# Patient Record
Sex: MALE | Race: WHITE | HISPANIC OR LATINO | URBAN - METROPOLITAN AREA
[De-identification: names, ages, dates, MRNs, and addresses within clinical notes are randomized per-mention and may not be internally consistent; named-entity substitution may affect disease eponyms.]

---

## 2020-07-17 ENCOUNTER — EMERGENCY (EMERGENCY)
Facility: HOSPITAL | Age: 70
LOS: 0 days | Discharge: HOME | End: 2020-07-17
Attending: EMERGENCY MEDICINE | Admitting: EMERGENCY MEDICINE
Payer: SUBSIDIZED

## 2020-07-17 VITALS
TEMPERATURE: 97 F | SYSTOLIC BLOOD PRESSURE: 202 MMHG | HEART RATE: 71 BPM | OXYGEN SATURATION: 96 % | RESPIRATION RATE: 18 BRPM | DIASTOLIC BLOOD PRESSURE: 103 MMHG | WEIGHT: 169.98 LBS

## 2020-07-17 VITALS
OXYGEN SATURATION: 97 % | SYSTOLIC BLOOD PRESSURE: 209 MMHG | RESPIRATION RATE: 18 BRPM | HEART RATE: 69 BPM | DIASTOLIC BLOOD PRESSURE: 95 MMHG

## 2020-07-17 DIAGNOSIS — I10 ESSENTIAL (PRIMARY) HYPERTENSION: ICD-10-CM

## 2020-07-17 DIAGNOSIS — Z79.899 OTHER LONG TERM (CURRENT) DRUG THERAPY: ICD-10-CM

## 2020-07-17 PROCEDURE — 99283 EMERGENCY DEPT VISIT LOW MDM: CPT

## 2020-07-17 RX ORDER — AMLODIPINE BESYLATE 2.5 MG/1
1 TABLET ORAL
Qty: 14 | Refills: 0
Start: 2020-07-17 | End: 2020-07-30

## 2020-07-17 RX ORDER — AMLODIPINE BESYLATE 2.5 MG/1
5 TABLET ORAL ONCE
Refills: 0 | Status: COMPLETED | OUTPATIENT
Start: 2020-07-17 | End: 2020-07-17

## 2020-07-17 RX ADMIN — AMLODIPINE BESYLATE 5 MILLIGRAM(S): 2.5 TABLET ORAL at 15:48

## 2020-07-17 NOTE — ED PROVIDER NOTE - CLINICAL SUMMARY MEDICAL DECISION MAKING FREE TEXT BOX
71 yo man with asymptomatic HTN noted while at the dentist.  Has had no healthcare.  Well appearing and normal exam.  Amlodipine and outpatient referral to medical clinic.

## 2020-07-17 NOTE — ED PROVIDER NOTE - NSFOLLOWUPCLINICS_GEN_ALL_ED_FT
[facility]  Medicine  [address], [place]   Phone: [phone]  Fax:   Follow Up Time: 1-3 Days

## 2020-07-17 NOTE — ED PROVIDER NOTE - NS ED ROS FT
Constitutional: See HPI.  Eyes: No visual changes, eye pain or discharge. No Photophobia  ENMT: No hearing changes, pain, discharge or infections. No neck pain or stiffness. No limited ROM  Cardiac: No SOB or edema. No chest pain with exertion.  Respiratory: No cough or respiratory distress.  GI: No nausea, vomiting, diarrhea or abdominal pain.  : No dysuria, frequency or burning. No Discharge  MS: No myalgia, muscle weakness, joint pain or back pain.  Neuro: No headache or weakness. No LOC.  Skin: No skin rash.  Except as documented in the HPI, all other systems are negative.

## 2020-07-17 NOTE — ED ADULT TRIAGE NOTE - CHIEF COMPLAINT QUOTE
"I went to the dentist yesterday and my blood pressure is high" pt states he doesn't know if he has history of hypertension because he hasn't been to the doctor in years

## 2020-07-17 NOTE — ED ADULT NURSE NOTE - NSIMPLEMENTINTERV_GEN_ALL_ED
Implemented All Universal Safety Interventions:  [place] to call system. Call bell, personal items and telephone within reach. Instruct patient to call for assistance. Room bathroom lighting operational. Non-slip footwear when patient is off stretcher. Physically safe environment: no spills, clutter or unnecessary equipment. Stretcher in lowest position, wheels locked, appropriate side rails in place.

## 2020-07-17 NOTE — ED PROVIDER NOTE - ATTENDING CONTRIBUTION TO CARE
I personally evaluated the patient. I reviewed the Resident’s or Physician Assistant’s note (as assigned above), and agree with the findings and plan except as documented in my note.  69 yo man with asymptomatic HTN.  Noted while at the dentist.  Will start amlodipine and refer to medical clinic with rapid appt made for patient.

## 2020-07-17 NOTE — ED ADULT NURSE NOTE - OBJECTIVE STATEMENT
Patient states he has high blood pressure. he was seen at his dentist yesterday where they did his BP. pt has no hx of HTN

## 2020-07-17 NOTE — ED PROVIDER NOTE - OBJECTIVE STATEMENT
69 y/o M with no known PMHx p/w hypertension, patient states he went to dentist and noticed  high blood pressure 200 systolic. denies any symptoms in the ED, no chest pain, no shortness of breath, no abdominal pain, no numbness, no weakness. Patient has not seen doctor in more than 5 years, no other complaints.

## 2020-07-17 NOTE — ED PROVIDER NOTE - PATIENT PORTAL LINK FT
You can access the FollowMyHealth Patient Portal offered by [facility] by registering at the following website: [email or web address]. By joining [employer]’s FollowMyHealth portal, you will also be able to view your health information using other applications (apps) compatible with our system.

## 2020-07-17 NOTE — ED ADULT NURSE REASSESSMENT NOTE - NS ED NURSE REASSESS COMMENT FT1
Pt reassessed A/O times 4 asymptomatic denies chest pain no SOB no dizziness is seen evaluate by ED attending Amlodipine 5 mg po order is given ,pt is seen evaluate by ED attending clear to go home with family members ambulate steady verbalize understanding of instruction given .

## 2020-07-29 ENCOUNTER — APPOINTMENT (OUTPATIENT)
Dept: GERIATRICS | Facility: CLINIC | Age: 70
End: 2020-07-29
Payer: COMMERCIAL

## 2020-07-29 ENCOUNTER — OUTPATIENT (OUTPATIENT)
Dept: OUTPATIENT SERVICES | Facility: HOSPITAL | Age: 70
LOS: 1 days | Discharge: HOME | End: 2020-07-29
Payer: SUBSIDIZED

## 2020-07-29 ENCOUNTER — OUTPATIENT (OUTPATIENT)
Dept: OUTPATIENT SERVICES | Facility: HOSPITAL | Age: 70
LOS: 1 days | Discharge: HOME | End: 2020-07-29

## 2020-07-29 VITALS
TEMPERATURE: 98.6 F | HEART RATE: 49 BPM | SYSTOLIC BLOOD PRESSURE: 178 MMHG | DIASTOLIC BLOOD PRESSURE: 89 MMHG | OXYGEN SATURATION: 97 %

## 2020-07-29 DIAGNOSIS — Z80.49 FAMILY HISTORY OF MALIGNANT NEOPLASM OF OTHER GENITAL ORGANS: ICD-10-CM

## 2020-07-29 DIAGNOSIS — Z87.891 PERSONAL HISTORY OF NICOTINE DEPENDENCE: ICD-10-CM

## 2020-07-29 DIAGNOSIS — Z80.0 FAMILY HISTORY OF MALIGNANT NEOPLASM OF DIGESTIVE ORGANS: ICD-10-CM

## 2020-07-29 DIAGNOSIS — Z87.19 PERSONAL HISTORY OF OTHER DISEASES OF THE DIGESTIVE SYSTEM: ICD-10-CM

## 2020-07-29 DIAGNOSIS — R94.31 ABNORMAL ELECTROCARDIOGRAM [ECG] [EKG]: ICD-10-CM

## 2020-07-29 DIAGNOSIS — K02.9 DENTAL CARIES, UNSPECIFIED: ICD-10-CM

## 2020-07-29 DIAGNOSIS — Z82.49 FAMILY HISTORY OF ISCHEMIC HEART DISEASE AND OTHER DISEASES OF THE CIRCULATORY SYSTEM: ICD-10-CM

## 2020-07-29 PROCEDURE — 99203 OFFICE O/P NEW LOW 30 MIN: CPT | Mod: GC

## 2020-07-29 PROCEDURE — 93010 ELECTROCARDIOGRAM REPORT: CPT

## 2020-07-29 RX ORDER — AMLODIPINE BESYLATE 5 MG/1
5 TABLET ORAL DAILY
Qty: 60 | Refills: 0 | Status: COMPLETED | COMMUNITY
Start: 2020-07-29

## 2020-07-30 NOTE — REVIEW OF SYSTEMS
[Pain] : pain [Vision Problems] : vision problems [Abdominal Pain] : abdominal pain [Constipation] : constipation [Dysuria] : dysuria [Hesitancy] : hesitancy [Itching] : itching [Mole Changes] : mole changes [Memory Loss] : memory loss [Insomnia] : insomnia [Depression] : depression [Negative] : ENT [Discharge] : no discharge [Dryness] : no dryness [Redness] : no redness [Itching] : no itching [Chest Pain] : no chest pain [Palpitations] : no palpitations [Claudication] : no  leg claudication [Lower Ext Edema] : no lower extremity edema [Orthopena] : no orthopnea [Paroxysmal Nocturnal Dyspnea] : no paroxysmal nocturnal dyspnea [Shortness Of Breath] : no shortness of breath [Cough] : no cough [Dyspnea on Exertion] : not dyspnea on exertion [Diarrhea] : no diarrhea [Nausea] : no nausea [Heartburn] : no heartburn [Vomiting] : no vomiting [Incontinence] : no incontinence [Melena] : no melena [Hematuria] : no hematuria [Frequency] : no frequency [Joint Pain] : no joint pain [Joint Stiffness] : no joint stiffness [Muscle Pain] : no muscle pain [Back Pain] : no back pain [Muscle Weakness] : no muscle weakness [Joint Swelling] : no joint swelling [Nail Changes] : no nail changes [Hair Changes] : no hair changes [Skin Rash] : no skin rash [Headache] : no headache [Dizziness] : no dizziness [Fainting] : no fainting [Confusion] : no confusion [Unsteady Walk] : no ataxia [Suicidal] : not suicidal [Anxiety] : no anxiety

## 2020-07-30 NOTE — HISTORY OF PRESENT ILLNESS
[FreeTextEntry1] : Hypertension, Initial visit [de-identified] : 69 y/o M w/ no pmh but no established PCP, p/w hypertension and to establish care.\par Earlier this month he went to the dentist and was found to have systolic pressure of ~200 and sent to ED. He was asymptomatic at the time. In the ED his BP was 202/103 w/ HR 73. He was given amlodipine 5 mg and was sent home. Patient was prescribed amlodipine and sent to pharmacy but states he did not receive the medication.\par Today in clinic, his BP is 178/89, HR 49, asymptomatic.

## 2020-07-30 NOTE — ASSESSMENT
[FreeTextEntry1] : 71 y/o M presents to clinic w/ recently diagnosed HTN after HTN urgency in ED, here today to establish care. Patient was prescribed amlodipine 5 mg qd but did not  medication from pharmacy. BP elevated today at 178/89 w/ baseline bradycardia (49 b/m, asymptomatic). \par \par #HTN (uncontrolled)\par ~200/100 in ED (7/17/20); Today 178/89\par Has not started amlodipine yet\par > Start amlodipine 5 mg PO qd, sent to [facility]\par > Daily home BP monitoring, BP cuff sent to home\par \par #HCM\par No prior labs on file\par > CBC, CMP, A1c, TSH\par > EKG\par \par #BPH (likely)\par Urinary hesitancy, frequency\par > Bladder scan\par \par #Vision changes and eye pain\par Cloudy, pain over the eyelids, worse after reading\par > Ophthalmology referral

## 2020-07-30 NOTE — PHYSICAL EXAM
[No Acute Distress] : no acute distress [Well Nourished] : well nourished [Well Developed] : well developed [Well-Appearing] : well-appearing [Normal Sclera/Conjunctiva] : normal sclera/conjunctiva [PERRL] : pupils equal round and reactive to light [EOMI] : extraocular movements intact [Normal Outer Ear/Nose] : the outer ears and nose were normal in appearance [Normal Oropharynx] : the oropharynx was normal [No Abdominal Bruit] : a ~M bruit was not heard ~T in the abdomen [No Edema] : there was no peripheral edema [No Extremity Clubbing/Cyanosis] : no extremity clubbing/cyanosis [Soft] : abdomen soft [Non-distended] : non-distended [Normal Bowel Sounds] : normal bowel sounds [LLQ] : in the left lower quadrant [Normal] : affect was normal and insight and judgment were intact [de-identified] : Several nevi, patient states many of these (espeically on his back) are new.

## 2020-07-30 NOTE — HEALTH RISK ASSESSMENT
[Fair] :  ~his/her~ mood as fair [Excellent] : ~his/her~ current health as excellent [] : Yes [Yes] : Yes [11-15] : 11-15 [Monthly or less (1 pt)] : Monthly or less (1 point) [No falls in past year] : Patient reported no falls in the past year [FreeTextEntry1] : Dentures [de-identified] : Walking in the park [YearQuit] : 2000 [de-identified] : No dietary restrictions

## 2020-07-31 ENCOUNTER — OUTPATIENT (OUTPATIENT)
Dept: OUTPATIENT SERVICES | Facility: HOSPITAL | Age: 70
LOS: 1 days | Discharge: HOME | End: 2020-07-31
Payer: SUBSIDIZED

## 2020-07-31 ENCOUNTER — OUTPATIENT (OUTPATIENT)
Dept: OUTPATIENT SERVICES | Facility: HOSPITAL | Age: 70
LOS: 1 days | Discharge: HOME | End: 2020-07-31

## 2020-07-31 PROCEDURE — 92012 INTRM OPH EXAM EST PATIENT: CPT

## 2020-07-31 PROCEDURE — 92134 CPTRZ OPH DX IMG PST SGM RTA: CPT | Mod: 26

## 2020-08-03 DIAGNOSIS — Z80.0 FAMILY HISTORY OF MALIGNANT NEOPLASM OF DIGESTIVE ORGANS: ICD-10-CM

## 2020-08-03 DIAGNOSIS — Z80.49 FAMILY HISTORY OF MALIGNANT NEOPLASM OF OTHER GENITAL ORGANS: ICD-10-CM

## 2020-08-03 DIAGNOSIS — K02.9 DENTAL CARIES, UNSPECIFIED: ICD-10-CM

## 2020-08-03 DIAGNOSIS — Z87.891 PERSONAL HISTORY OF NICOTINE DEPENDENCE: ICD-10-CM

## 2020-08-03 DIAGNOSIS — E66.9 OBESITY, UNSPECIFIED: ICD-10-CM

## 2020-08-03 DIAGNOSIS — N40.0 BENIGN PROSTATIC HYPERPLASIA WITHOUT LOWER URINARY TRACT SYMPTOMS: ICD-10-CM

## 2020-08-03 DIAGNOSIS — I10 ESSENTIAL (PRIMARY) HYPERTENSION: ICD-10-CM

## 2020-08-03 DIAGNOSIS — Z82.49 FAMILY HISTORY OF ISCHEMIC HEART DISEASE AND OTHER DISEASES OF THE CIRCULATORY SYSTEM: ICD-10-CM

## 2020-08-03 DIAGNOSIS — Z87.19 PERSONAL HISTORY OF OTHER DISEASES OF THE DIGESTIVE SYSTEM: ICD-10-CM

## 2020-08-25 LAB
ALBUMIN SERPL ELPH-MCNC: 4.6 G/DL
ALP BLD-CCNC: 83 U/L
ALT SERPL-CCNC: 18 U/L
ANION GAP SERPL CALC-SCNC: 14 MMOL/L
AST SERPL-CCNC: 20 U/L
BASOPHILS # BLD AUTO: 0.02 K/UL
BASOPHILS NFR BLD AUTO: 0.4 %
BILIRUB SERPL-MCNC: 0.5 MG/DL
BUN SERPL-MCNC: 11 MG/DL
CALCIUM SERPL-MCNC: 9.2 MG/DL
CHLORIDE SERPL-SCNC: 100 MMOL/L
CHOLEST SERPL-MCNC: 192 MG/DL
CHOLEST/HDLC SERPL: 5.3 RATIO
CO2 SERPL-SCNC: 25 MMOL/L
CREAT SERPL-MCNC: 0.9 MG/DL
CREAT SPEC-SCNC: 102 MG/DL
EOSINOPHIL # BLD AUTO: 0.1 K/UL
EOSINOPHIL NFR BLD AUTO: 1.9 %
ESTIMATED AVERAGE GLUCOSE: 114 MG/DL
GLUCOSE SERPL-MCNC: 100 MG/DL
HBA1C MFR BLD HPLC: 5.6 %
HCT VFR BLD CALC: 45.2 %
HDLC SERPL-MCNC: 36 MG/DL
HGB BLD-MCNC: 13.8 G/DL
IMM GRANULOCYTES NFR BLD AUTO: 0.8 %
LDLC SERPL CALC-MCNC: 102 MG/DL
LYMPHOCYTES # BLD AUTO: 1.25 K/UL
LYMPHOCYTES NFR BLD AUTO: 23.7 %
MAN DIFF?: NORMAL
MCHC RBC-ENTMCNC: 29.4 PG
MCHC RBC-ENTMCNC: 30.5 G/DL
MCV RBC AUTO: 96.4 FL
MICROALBUMIN 24H UR DL<=1MG/L-MCNC: 3.3 MG/DL
MICROALBUMIN/CREAT 24H UR-RTO: 32 MG/G
MONOCYTES # BLD AUTO: 0.45 K/UL
MONOCYTES NFR BLD AUTO: 8.5 %
NEUTROPHILS # BLD AUTO: 3.41 K/UL
NEUTROPHILS NFR BLD AUTO: 64.7 %
PLATELET # BLD AUTO: 199 K/UL
POTASSIUM SERPL-SCNC: 5 MMOL/L
PROT SERPL-MCNC: 7 G/DL
RBC # BLD: 4.69 M/UL
RBC # FLD: 14.3 %
SODIUM SERPL-SCNC: 139 MMOL/L
TRIGL SERPL-MCNC: 293 MG/DL
TSH SERPL-ACNC: 2.67 UIU/ML
WBC # FLD AUTO: 5.27 K/UL

## 2020-08-26 ENCOUNTER — APPOINTMENT (OUTPATIENT)
Dept: GERIATRICS | Facility: CLINIC | Age: 70
End: 2020-08-26

## 2020-09-08 ENCOUNTER — APPOINTMENT (OUTPATIENT)
Dept: INTERNAL MEDICINE | Facility: CLINIC | Age: 70
End: 2020-09-08

## 2020-09-30 ENCOUNTER — OUTPATIENT (OUTPATIENT)
Dept: OUTPATIENT SERVICES | Facility: HOSPITAL | Age: 70
LOS: 1 days | Discharge: HOME | End: 2020-09-30

## 2020-09-30 ENCOUNTER — APPOINTMENT (OUTPATIENT)
Dept: GERIATRICS | Facility: CLINIC | Age: 70
End: 2020-09-30
Payer: COMMERCIAL

## 2020-09-30 VITALS
BODY MASS INDEX: 33.63 KG/M2 | DIASTOLIC BLOOD PRESSURE: 84 MMHG | WEIGHT: 197 LBS | TEMPERATURE: 97.6 F | HEIGHT: 64 IN | SYSTOLIC BLOOD PRESSURE: 160 MMHG

## 2020-09-30 DIAGNOSIS — H53.9 UNSPECIFIED VISUAL DISTURBANCE: ICD-10-CM

## 2020-09-30 DIAGNOSIS — I10 ESSENTIAL (PRIMARY) HYPERTENSION: ICD-10-CM

## 2020-09-30 DIAGNOSIS — N40.0 BENIGN PROSTATIC HYPERPLASIA WITHOUT LOWER URINARY TRACT SYMPTOMS: ICD-10-CM

## 2020-09-30 PROCEDURE — 99212 OFFICE O/P EST SF 10 MIN: CPT

## 2020-09-30 RX ORDER — AMLODIPINE BESYLATE 5 MG/1
5 TABLET ORAL DAILY
Qty: 90 | Refills: 0 | Status: DISCONTINUED | COMMUNITY
Start: 2020-07-29 | End: 2020-09-30

## 2020-09-30 NOTE — END OF VISIT
[] : Resident [FreeTextEntry3] : Seen with geriatrics Team; patient has HTN - did not take his medication today and his systolic is over 160 - counseled patient and daughter at length re importance of compliance with medications and his CV/stroke risk without BP control; they expressed understanding; also patient refuses referral for screening colonoscopy albeit mild anemia; counseled; also refuses flu vaccine "I don't need it; I feel strong";counseled that during COVID pandemic it is especially important to be vaccinated; patient also has symptoms c/w BPH with LUTS; will start tamsulosin and if no improvement he then agrees to urology evaluation.

## 2020-09-30 NOTE — PHYSICAL EXAM
[General Appearance - In No Acute Distress] : in no acute distress [Sclera] : the sclera and conjunctiva were normal [General Appearance - Alert] : alert [Apical Impulse] : the apical impulse was normal [] : no respiratory distress [Normal Oral Mucosa] : normal oral mucosa [No CVA Tenderness] : no ~M costovertebral angle tenderness [Bowel Sounds] : normal bowel sounds [Abnormal Walk] : normal gait

## 2020-09-30 NOTE — ASSESSMENT
[FreeTextEntry1] : 71 Y/O M with PMH of HTN is here for the follow up.\par \par HTN\par -Amlodipine 10mg, home readings stable\par -Forgot to take med today\par \par LUTS\par -Most likely BPH\par -Will give Tamsulosin 0.4mg\par -If symptoms does not improve will send to urology\par \par Vision changes\par -B/L Vision changes\par -Opthalmology referral, patient have the referral\par \par HCM\par -Routine labs\par -Refuses Flu shot and Colonoscopy

## 2020-09-30 NOTE — HISTORY OF PRESENT ILLNESS
[FreeTextEntry1] : 71 Y/O M with PMH of HTN, Vision problems and difficulty urination is here to establish care.

## 2020-10-06 ENCOUNTER — OUTPATIENT (OUTPATIENT)
Dept: OUTPATIENT SERVICES | Facility: HOSPITAL | Age: 70
LOS: 1 days | Discharge: HOME | End: 2020-10-06

## 2020-10-06 ENCOUNTER — APPOINTMENT (OUTPATIENT)
Dept: OPHTHALMOLOGY | Facility: CLINIC | Age: 70
End: 2020-10-06
Payer: COMMERCIAL

## 2020-10-06 PROCEDURE — 92020 GONIOSCOPY: CPT

## 2020-10-06 PROCEDURE — 92012 INTRM OPH EXAM EST PATIENT: CPT

## 2020-10-07 ENCOUNTER — OUTPATIENT (OUTPATIENT)
Dept: OUTPATIENT SERVICES | Facility: HOSPITAL | Age: 70
LOS: 1 days | Discharge: HOME | End: 2020-10-07

## 2020-10-07 DIAGNOSIS — K02.63 DENTAL CARIES ON SMOOTH SURFACE PENETRATING INTO PULP: ICD-10-CM

## 2020-10-09 DIAGNOSIS — H52.4 PRESBYOPIA: ICD-10-CM

## 2020-10-09 DIAGNOSIS — H40.033 ANATOMICAL NARROW ANGLE, BILATERAL: ICD-10-CM

## 2020-10-09 DIAGNOSIS — H11.043 PERIPHERAL PTERYGIUM, STATIONARY, BILATERAL: ICD-10-CM

## 2020-12-30 ENCOUNTER — APPOINTMENT (OUTPATIENT)
Dept: GERIATRICS | Facility: CLINIC | Age: 70
End: 2020-12-30

## 2021-01-27 RX ORDER — AMLODIPINE BESYLATE 10 MG/1
10 TABLET ORAL
Refills: 0 | Status: COMPLETED | COMMUNITY
End: 2021-01-27

## 2021-05-20 ENCOUNTER — OUTPATIENT (OUTPATIENT)
Dept: OUTPATIENT SERVICES | Facility: HOSPITAL | Age: 71
LOS: 1 days | Discharge: HOME | End: 2021-05-20

## 2021-05-20 ENCOUNTER — APPOINTMENT (OUTPATIENT)
Dept: GERIATRICS | Facility: CLINIC | Age: 71
End: 2021-05-20

## 2021-05-27 NOTE — HISTORY OF PRESENT ILLNESS
[FreeTextEntry1] :  72 yo M with PMHX BPH, HTN, obesity being seen for f/u visit. last seen Sept 2020. Pt had 12:30 appt was unable to stay, stating he had to go to airport. Medications were refilled. He has a 6/17 apptm

## 2021-05-27 NOTE — ASSESSMENT
[FreeTextEntry1] : 72 yo M PMHx HTN, LUTS, BPH Vision changes, \par \par # HTN\par - 160/84 today\par -Amlodipine 10mg, home readings stable\par -Forgot to take med today\par \par # Bradycardia\par - Asymptomatic \par - Seen on EKG 2020\par \par #Lower urinary tract symptoms\par # BPH\par -Tamsulosin 0.4mg\par -If symptoms does not improve will send to urology\par \par # Vision changes\par -B/L Vision changes\par -Opthalmology referral, patient have the referral\par \par HCM\par -Routine labs\par -Refuses Flu shot and Colonoscopy. \par - RTC in ____\par - Routine labs ordered

## 2021-06-04 DIAGNOSIS — Z02.9 ENCOUNTER FOR ADMINISTRATIVE EXAMINATIONS, UNSPECIFIED: ICD-10-CM

## 2021-06-10 ENCOUNTER — RX RENEWAL (OUTPATIENT)
Age: 71
End: 2021-06-10

## 2021-06-17 ENCOUNTER — OUTPATIENT (OUTPATIENT)
Dept: OUTPATIENT SERVICES | Facility: HOSPITAL | Age: 71
LOS: 1 days | Discharge: HOME | End: 2021-06-17

## 2021-06-17 ENCOUNTER — NON-APPOINTMENT (OUTPATIENT)
Age: 71
End: 2021-06-17

## 2021-06-17 ENCOUNTER — APPOINTMENT (OUTPATIENT)
Dept: GERIATRICS | Facility: CLINIC | Age: 71
End: 2021-06-17
Payer: COMMERCIAL

## 2021-06-17 VITALS
BODY MASS INDEX: 35.34 KG/M2 | HEART RATE: 54 BPM | TEMPERATURE: 96.3 F | DIASTOLIC BLOOD PRESSURE: 83 MMHG | HEIGHT: 64 IN | WEIGHT: 207 LBS | OXYGEN SATURATION: 98 % | SYSTOLIC BLOOD PRESSURE: 156 MMHG

## 2021-06-17 DIAGNOSIS — H53.9 UNSPECIFIED VISUAL DISTURBANCE: ICD-10-CM

## 2021-06-17 DIAGNOSIS — Z00.00 ENCOUNTER FOR GENERAL ADULT MEDICAL EXAMINATION W/OUT ABNORMAL FINDINGS: ICD-10-CM

## 2021-06-17 PROCEDURE — 99212 OFFICE O/P EST SF 10 MIN: CPT

## 2021-06-17 NOTE — REVIEW OF SYSTEMS
[Fever] : no fever [Chills] : no chills [Feeling Poorly] : not feeling poorly [Feeling Tired] : not feeling tired [Recent Weight Gain (___ Lbs)] : no recent weight gain [Eye Pain] : no eye pain [Red Eyes] : eyes not red [Eyesight Problems] : no eyesight problems [Discharge From Eyes] : no purulent discharge from the eyes [Earache] : no earache [Loss Of Hearing] : no hearing loss [Nosebleeds] : no nosebleeds [Nasal Discharge] : no nasal discharge [Shortness Of Breath] : no shortness of breath [Wheezing] : no wheezing [Cough] : no cough [SOB on Exertion] : no shortness of breath during exertion [Abdominal Pain] : no abdominal pain [Vomiting] : no vomiting [Constipation] : no constipation [Diarrhea] : no diarrhea [Arthralgias] : no arthralgias [Joint Pain] : no joint pain [Joint Swelling] : no joint swelling [Joint Stiffness] : no joint stiffness [Skin Lesions] : no skin lesions [Itching] : no itching [Suicidal] : not suicidal [Proptosis] : no proptosis

## 2021-06-17 NOTE — END OF VISIT
[] : Resident [FreeTextEntry3] : SEEN WITH [facility] TEAM; poor health literacy - spoke with patient in Thai about why he won't get COVID vaccine - " I don’t take tylenol because I don’t have a HA; i wont take vaccine because I feel fine"; will continue to encourage patient at subsequent visits; also adding to meds for symptoms of BPH with LUTS

## 2021-06-17 NOTE — ASSESSMENT
[FreeTextEntry1] : Case of a 71 year old male patient with BPH and HTN presenting for regular checkup\par \par BPH. \par * Home meds Tamsulosin 0.4 mg QD\par * Complaining of difficulty initiating stream, intermittence, PVR\par - Will add finasteride 6mg QD\par - Will consider urology referral\par \par HTN\par * Well Controlled. \par * /83mmHg during this visit. \par * Home med Amlodipine 10mg QD\par - Monitor BP closely\par - Continue Amlodipine 10mg QD\par - Educated about DASH diet and exercise\par \par HCM\par - Refused colonoscopy in past and currently\par - Refused flu shot\par - Refused COVID vaccine\par - Received PPSV 11/02/20\par - Received Tdap 11/02/20

## 2021-06-17 NOTE — HISTORY OF PRESENT ILLNESS
[FreeTextEntry1] : 71 year old male patient\par - BPH. Home meds Tamsulosin 0.4 mg QD\par - HTN. Well Controlled. /83mmHg during this visit. Home med Amlodipine 10mg QD\par \par He is presenting to us for follow up.\par Patient reports adequate appetite and denies nausea or vomiting or weight change or change in bowel movements (hematochezia or melena).\par He reports urinary symptoms: burning (dysuria), intermittence, difficulty initiating stream, and PVR in the absence of hematuria or cloudy urine.\par ROs negative for fever or chills or GI or URTI symptoms.

## 2021-06-17 NOTE — PHYSICAL EXAM
[General Appearance - Alert] : alert [General Appearance - In No Acute Distress] : in no acute distress [General Appearance - Well Nourished] : well nourished [General Appearance - Well Developed] : well developed [General Appearance - Well-Appearing] : healthy appearing [Sclera] : the sclera and conjunctiva were normal [Normal Oral Mucosa] : normal oral mucosa [No Oral Pallor] : no oral pallor [No Oral Cyanosis] : no oral cyanosis [Neck Appearance] : the appearance of the neck was normal [Neck Cervical Mass (___cm)] : no neck mass was observed [Jugular Venous Distention Increased] : there was no jugular-venous distention [Respiration, Rhythm And Depth] : normal respiratory rhythm and effort [Exaggerated Use Of Accessory Muscles For Inspiration] : no accessory muscle use [Chest Palpation] : palpation of the chest revealed no abnormalities [Auscultation Breath Sounds / Voice Sounds] : lungs were clear to auscultation bilaterally [Apical Impulse] : the apical impulse was normal [Heart Rate And Rhythm] : heart rate was normal and rhythm regular [Heart Sounds Gallop] : no gallops [Heart Sounds] : normal S1 and S2 [Murmurs] : no murmurs [Heart Sounds Pericardial Friction Rub] : no pericardial rub [Arterial Pulses Carotid] : carotid pulses were normal with no bruits [No CVA Tenderness] : no ~M costovertebral angle tenderness [Nail Clubbing] : no clubbing  or cyanosis of the fingernails [Abnormal Walk] : normal gait [Involuntary Movements] : no involuntary movements were seen [Musculoskeletal - Swelling] : no joint swelling seen [Skin Color & Pigmentation] : normal skin color and pigmentation [Motor Tone] : muscle strength and tone were normal [Skin Lesions] : no skin lesions [] : no rash [Cranial Nerves] : cranial nerves 2-12 were intact

## 2021-06-24 LAB
25(OH)D3 SERPL-MCNC: 22 NG/ML
ALBUMIN SERPL ELPH-MCNC: 4.5 G/DL
ALP BLD-CCNC: 90 U/L
ALT SERPL-CCNC: 39 U/L
ANION GAP SERPL CALC-SCNC: 10 MMOL/L
AST SERPL-CCNC: 36 U/L
BASOPHILS # BLD AUTO: 0.04 K/UL
BASOPHILS NFR BLD AUTO: 0.6 %
BILIRUB SERPL-MCNC: 0.8 MG/DL
BUN SERPL-MCNC: 12 MG/DL
CALCIUM SERPL-MCNC: 9.2 MG/DL
CHLORIDE SERPL-SCNC: 101 MMOL/L
CHOLEST SERPL-MCNC: 256 MG/DL
CO2 SERPL-SCNC: 25 MMOL/L
CREAT SERPL-MCNC: 0.8 MG/DL
EOSINOPHIL # BLD AUTO: 0.18 K/UL
EOSINOPHIL NFR BLD AUTO: 2.8 %
ESTIMATED AVERAGE GLUCOSE: 120 MG/DL
GLUCOSE SERPL-MCNC: 89 MG/DL
HBA1C MFR BLD HPLC: 5.8 %
HCT VFR BLD CALC: 41 %
HDLC SERPL-MCNC: 41 MG/DL
HGB BLD-MCNC: 13.2 G/DL
IMM GRANULOCYTES NFR BLD AUTO: 1.1 %
LDLC SERPL CALC-MCNC: 155 MG/DL
LYMPHOCYTES # BLD AUTO: 1.95 K/UL
LYMPHOCYTES NFR BLD AUTO: 29.9 %
MAN DIFF?: NORMAL
MCHC RBC-ENTMCNC: 30.3 PG
MCHC RBC-ENTMCNC: 32.2 G/DL
MCV RBC AUTO: 94.3 FL
MONOCYTES # BLD AUTO: 0.47 K/UL
MONOCYTES NFR BLD AUTO: 7.2 %
NEUTROPHILS # BLD AUTO: 3.81 K/UL
NEUTROPHILS NFR BLD AUTO: 58.4 %
NONHDLC SERPL-MCNC: 215 MG/DL
PLATELET # BLD AUTO: 206 K/UL
POTASSIUM SERPL-SCNC: 4.7 MMOL/L
PROT SERPL-MCNC: 6.9 G/DL
RBC # BLD: 4.35 M/UL
RBC # FLD: 14.9 %
SODIUM SERPL-SCNC: 136 MMOL/L
TRIGL SERPL-MCNC: 308 MG/DL
TSH SERPL-ACNC: 2.51 UIU/ML
WBC # FLD AUTO: 6.52 K/UL

## 2021-06-25 DIAGNOSIS — I10 ESSENTIAL (PRIMARY) HYPERTENSION: ICD-10-CM

## 2021-06-25 DIAGNOSIS — H53.9 UNSPECIFIED VISUAL DISTURBANCE: ICD-10-CM

## 2021-06-25 DIAGNOSIS — N40.0 BENIGN PROSTATIC HYPERPLASIA WITHOUT LOWER URINARY TRACT SYMPTOMS: ICD-10-CM

## 2021-12-03 ENCOUNTER — INPATIENT (INPATIENT)
Facility: HOSPITAL | Age: 71
LOS: 9 days | Discharge: HOME HEALTH | End: 2021-12-13
Attending: HOSPITALIST | Admitting: HOSPITALIST
Payer: MEDICAID

## 2021-12-03 VITALS — WEIGHT: 199.96 LBS

## 2021-12-03 DIAGNOSIS — U07.1 COVID-19: ICD-10-CM

## 2021-12-03 DIAGNOSIS — I10 ESSENTIAL (PRIMARY) HYPERTENSION: ICD-10-CM

## 2021-12-03 DIAGNOSIS — E66.9 OBESITY, UNSPECIFIED: ICD-10-CM

## 2021-12-03 DIAGNOSIS — E87.1 HYPO-OSMOLALITY AND HYPONATREMIA: ICD-10-CM

## 2021-12-03 DIAGNOSIS — J12.82 PNEUMONIA DUE TO CORONAVIRUS DISEASE 2019: ICD-10-CM

## 2021-12-03 DIAGNOSIS — J96.02 ACUTE RESPIRATORY FAILURE WITH HYPERCAPNIA: ICD-10-CM

## 2021-12-03 LAB
ALBUMIN SERPL ELPH-MCNC: 3.9 G/DL — SIGNIFICANT CHANGE UP (ref 3.5–5.2)
ALP SERPL-CCNC: 70 U/L — SIGNIFICANT CHANGE UP (ref 30–115)
ALT FLD-CCNC: 32 U/L — SIGNIFICANT CHANGE UP (ref 0–41)
ANION GAP SERPL CALC-SCNC: 13 MMOL/L — SIGNIFICANT CHANGE UP (ref 7–14)
AST SERPL-CCNC: 52 U/L — HIGH (ref 0–41)
BASE EXCESS BLDV CALC-SCNC: 1.2 MMOL/L — SIGNIFICANT CHANGE UP (ref -2–3)
BASOPHILS # BLD AUTO: 0.01 K/UL — SIGNIFICANT CHANGE UP (ref 0–0.2)
BASOPHILS NFR BLD AUTO: 0.2 % — SIGNIFICANT CHANGE UP (ref 0–1)
BILIRUB SERPL-MCNC: 0.6 MG/DL — SIGNIFICANT CHANGE UP (ref 0.2–1.2)
BUN SERPL-MCNC: 11 MG/DL — SIGNIFICANT CHANGE UP (ref 10–20)
CA-I SERPL-SCNC: 1.13 MMOL/L — LOW (ref 1.15–1.33)
CALCIUM SERPL-MCNC: 8.1 MG/DL — LOW (ref 8.5–10.1)
CHLORIDE SERPL-SCNC: 93 MMOL/L — LOW (ref 98–110)
CO2 SERPL-SCNC: 22 MMOL/L — SIGNIFICANT CHANGE UP (ref 17–32)
CREAT SERPL-MCNC: 0.9 MG/DL — SIGNIFICANT CHANGE UP (ref 0.7–1.5)
EOSINOPHIL # BLD AUTO: 0 K/UL — SIGNIFICANT CHANGE UP (ref 0–0.7)
EOSINOPHIL NFR BLD AUTO: 0 % — SIGNIFICANT CHANGE UP (ref 0–8)
GAS PNL BLDV: 126 MMOL/L — LOW (ref 136–145)
GAS PNL BLDV: SIGNIFICANT CHANGE UP
GLUCOSE SERPL-MCNC: 106 MG/DL — HIGH (ref 70–99)
HCO3 BLDV-SCNC: 26 MMOL/L — SIGNIFICANT CHANGE UP (ref 22–29)
HCT VFR BLD CALC: 37.4 % — LOW (ref 42–52)
HCT VFR BLDA CALC: 36 % — LOW (ref 39–51)
HGB BLD CALC-MCNC: 12 G/DL — LOW (ref 12.6–17.4)
HGB BLD-MCNC: 12.6 G/DL — LOW (ref 14–18)
IMM GRANULOCYTES NFR BLD AUTO: 0.7 % — HIGH (ref 0.1–0.3)
LACTATE BLDV-MCNC: 1.4 MMOL/L — SIGNIFICANT CHANGE UP (ref 0.5–2)
LYMPHOCYTES # BLD AUTO: 0.69 K/UL — LOW (ref 1.2–3.4)
LYMPHOCYTES # BLD AUTO: 15.2 % — LOW (ref 20.5–51.1)
MCHC RBC-ENTMCNC: 29.5 PG — SIGNIFICANT CHANGE UP (ref 27–31)
MCHC RBC-ENTMCNC: 33.7 G/DL — SIGNIFICANT CHANGE UP (ref 32–37)
MCV RBC AUTO: 87.6 FL — SIGNIFICANT CHANGE UP (ref 80–94)
MONOCYTES # BLD AUTO: 0.45 K/UL — SIGNIFICANT CHANGE UP (ref 0.1–0.6)
MONOCYTES NFR BLD AUTO: 9.9 % — HIGH (ref 1.7–9.3)
NEUTROPHILS # BLD AUTO: 3.36 K/UL — SIGNIFICANT CHANGE UP (ref 1.4–6.5)
NEUTROPHILS NFR BLD AUTO: 74 % — SIGNIFICANT CHANGE UP (ref 42.2–75.2)
NRBC # BLD: 0 /100 WBCS — SIGNIFICANT CHANGE UP (ref 0–0)
NT-PROBNP SERPL-SCNC: 52 PG/ML — SIGNIFICANT CHANGE UP (ref 0–300)
PCO2 BLDV: 41 MMHG — LOW (ref 42–55)
PH BLDV: 7.41 — SIGNIFICANT CHANGE UP (ref 7.32–7.43)
PLATELET # BLD AUTO: 179 K/UL — SIGNIFICANT CHANGE UP (ref 130–400)
PO2 BLDV: 34 MMHG — SIGNIFICANT CHANGE UP
POTASSIUM BLDV-SCNC: 4.2 MMOL/L — SIGNIFICANT CHANGE UP (ref 3.5–5.1)
POTASSIUM SERPL-MCNC: 4.7 MMOL/L — SIGNIFICANT CHANGE UP (ref 3.5–5)
POTASSIUM SERPL-SCNC: 4.7 MMOL/L — SIGNIFICANT CHANGE UP (ref 3.5–5)
PROT SERPL-MCNC: 6.3 G/DL — SIGNIFICANT CHANGE UP (ref 6–8)
RAPID RVP RESULT: DETECTED
RBC # BLD: 4.27 M/UL — LOW (ref 4.7–6.1)
RBC # FLD: 13.9 % — SIGNIFICANT CHANGE UP (ref 11.5–14.5)
SAO2 % BLDV: 61.9 % — SIGNIFICANT CHANGE UP
SARS-COV-2 RNA SPEC QL NAA+PROBE: DETECTED
SODIUM SERPL-SCNC: 128 MMOL/L — LOW (ref 135–146)
TROPONIN T SERPL-MCNC: <0.01 NG/ML — SIGNIFICANT CHANGE UP
WBC # BLD: 4.54 K/UL — LOW (ref 4.8–10.8)
WBC # FLD AUTO: 4.54 K/UL — LOW (ref 4.8–10.8)

## 2021-12-03 PROCEDURE — 71045 X-RAY EXAM CHEST 1 VIEW: CPT | Mod: 26

## 2021-12-03 PROCEDURE — 99223 1ST HOSP IP/OBS HIGH 75: CPT

## 2021-12-03 PROCEDURE — 99285 EMERGENCY DEPT VISIT HI MDM: CPT

## 2021-12-03 PROCEDURE — 99497 ADVNCD CARE PLAN 30 MIN: CPT | Mod: 25

## 2021-12-03 PROCEDURE — 70450 CT HEAD/BRAIN W/O DYE: CPT | Mod: 26,MA

## 2021-12-03 PROCEDURE — 93010 ELECTROCARDIOGRAM REPORT: CPT

## 2021-12-03 RX ORDER — PREGABALIN 225 MG/1
1 CAPSULE ORAL
Qty: 0 | Refills: 0 | DISCHARGE

## 2021-12-03 RX ORDER — ACETAMINOPHEN 500 MG
650 TABLET ORAL EVERY 8 HOURS
Refills: 0 | Status: ACTIVE | OUTPATIENT
Start: 2021-12-03 | End: 2022-11-01

## 2021-12-03 RX ORDER — SODIUM CHLORIDE 9 MG/ML
1000 INJECTION INTRAMUSCULAR; INTRAVENOUS; SUBCUTANEOUS
Refills: 0 | Status: COMPLETED | OUTPATIENT
Start: 2021-12-03 | End: 2021-12-04

## 2021-12-03 RX ORDER — DEXAMETHASONE 0.5 MG/5ML
6 ELIXIR ORAL ONCE
Refills: 0 | Status: COMPLETED | OUTPATIENT
Start: 2021-12-03 | End: 2021-12-03

## 2021-12-03 RX ORDER — ENOXAPARIN SODIUM 100 MG/ML
40 INJECTION SUBCUTANEOUS EVERY 12 HOURS
Refills: 0 | Status: ACTIVE | OUTPATIENT
Start: 2021-12-03 | End: 2022-11-01

## 2021-12-03 RX ORDER — CHOLECALCIFEROL (VITAMIN D3) 125 MCG
0 CAPSULE ORAL
Qty: 0 | Refills: 0 | DISCHARGE

## 2021-12-03 RX ORDER — ACETAMINOPHEN 500 MG
975 TABLET ORAL ONCE
Refills: 0 | Status: COMPLETED | OUTPATIENT
Start: 2021-12-03 | End: 2021-12-03

## 2021-12-03 RX ORDER — SODIUM CHLORIDE 9 MG/ML
1000 INJECTION INTRAMUSCULAR; INTRAVENOUS; SUBCUTANEOUS ONCE
Refills: 0 | Status: DISCONTINUED | OUTPATIENT
Start: 2021-12-03 | End: 2021-12-03

## 2021-12-03 RX ORDER — FUROSEMIDE 40 MG
20 TABLET ORAL ONCE
Refills: 0 | Status: COMPLETED | OUTPATIENT
Start: 2021-12-03 | End: 2021-12-03

## 2021-12-03 RX ORDER — DEXAMETHASONE 0.5 MG/5ML
6 ELIXIR ORAL DAILY
Refills: 0 | Status: COMPLETED | OUTPATIENT
Start: 2021-12-04 | End: 2021-12-12

## 2021-12-03 RX ORDER — REMDESIVIR 5 MG/ML
INJECTION INTRAVENOUS
Refills: 0 | Status: COMPLETED | OUTPATIENT
Start: 2021-12-04 | End: 2021-12-08

## 2021-12-03 RX ORDER — AMLODIPINE BESYLATE 2.5 MG/1
1 TABLET ORAL
Qty: 0 | Refills: 0 | DISCHARGE

## 2021-12-03 RX ORDER — AMLODIPINE BESYLATE 2.5 MG/1
5 TABLET ORAL DAILY
Refills: 0 | Status: ACTIVE | OUTPATIENT
Start: 2021-12-04 | End: 2022-11-02

## 2021-12-03 RX ADMIN — Medication 6 MILLIGRAM(S): at 22:16

## 2021-12-03 RX ADMIN — Medication 20 MILLIGRAM(S): at 22:16

## 2021-12-03 RX ADMIN — Medication 975 MILLIGRAM(S): at 19:14

## 2021-12-03 NOTE — ED ADULT TRIAGE NOTE - CHIEF COMPLAINT QUOTE
Patient brought in from home for generalized weakness and fever. Patient denies SOB, chest pain, n/v/d.

## 2021-12-03 NOTE — ED PROVIDER NOTE - CLINICAL SUMMARY MEDICAL DECISION MAKING FREE TEXT BOX
I have fully discussed the medical management and delivery of care with the patient. I have discussed any available labs, imaging and treatment options with the patient.  Pt admitted for further care & management.  Medical admitting team aware of pt and admission.

## 2021-12-03 NOTE — ED PROVIDER NOTE - CARE PLAN
Assessment and plan of treatment:	Plan: Monitor, NC, EKG, CXR, labs, urine, anti pyretic, reassess.   Principal Discharge DX:	2019 novel coronavirus disease (COVID-19)  Assessment and plan of treatment:	Plan: Monitor, NC, EKG, CXR, labs, urine, anti pyretic, reassess.   1 Principal Discharge DX:	2019 novel coronavirus disease (COVID-19)  Assessment and plan of treatment:	Plan: Monitor, NC, EKG, CXR, labs, urine, anti pyretic, reassess.  Secondary Diagnosis:	Hypoxia

## 2021-12-03 NOTE — ED PROVIDER NOTE - ATTENDING CONTRIBUTION TO CARE
[id number] used  72 y/o m w/ pmhx of htn and ? bph presents for about a week of decreased oral intake that has been worsening so came to ed. pt reports fever in ed [place] fever at home. pt denying any cough but with dry coug on exam. not covid vaccinated.   No chills, n/v, cp, sob, pleuritic cp, palpitations, diaphoresis, sore throat, ear pain, neck pain/stiffness, abd pain, diarrhea, constipation, urinary symptoms, trauma, ha/lh/dizziness, numbness/tingling, sick contacts, recent travel, or rash.     on exam: Non toxic appearing pt sitting on stretcher in nad, speaking full sentences, no rash, mmm, no erythema, exudates, or petechiae, no rhinorrhea, rrr, radial pulses 2/4 b/l, no jvd, dry cough on exam, breath sounds present b/l, no wheezing or crackles,  no accessory muscle use, no tachypnea, no stridor, bs present throughout all 4 quadrants, saturation 88 on ra, 93 candice 3 l NC, abd soft, nd, nt, no rebound tenderness or guarding, no cvat, FROM of ext, no edema, no calf pain/swelling/erythema, AAOx3.  motor 5/5 and sensation intact throughout upper and lower ext. No focal deficits.

## 2021-12-03 NOTE — H&P ADULT - ASSESSMENT
71 yr old male with hx of HTN admitted for COVID PNA    # AHRF secondary to COVID 19 PNA  - day 5 of symptoms  - sat 88% on RA  --> 95% on 3L NC  - CXR: b/l infiltrates  - EKG: NSR  - start decadron and Remdesivir  - start Tylenol prn  - start guaifenesin   - check D dimer, CRP, ferritin, procal   - IMPROVE Risk Score = 0  - isolation precaution   - transfer to [place]     # Mild Transaminitis  - likely secondary to vial infection     # Hyponatremia   - start NS@65; stop after 1L     # HTN  - c/w amlodipine 5mg     # Regular diet     # DVT ppx   - start Lovenox    # Ambulate as tolerated    # Full code 71 yr old male with hx of HTN admitted for COVID PNA    # AHRF secondary to COVID 19 PNA  - day 5 of symptoms  - sat 88% on RA  --> 95% on 3L NC  - CXR: b/l infiltrates  - EKG: NSR  - start decadron and Remdesivir  - start Tylenol prn  - start guaifenesin   - check D dimer, CRP, ferritin, procal   - IMPROVE Risk Score = 0  - isolation precaution   - transfer to [place]     # Mild Transaminitis  - likely secondary to vial infection     # Hyponatremia   - start NS@65; stop after 1L     # HTN  - c/w amlodipine 5mg     # DASH diet     # DVT ppx   - start Lovenox    # Ambulate as tolerated    # Full code

## 2021-12-03 NOTE — H&P ADULT - HISTORY OF PRESENT ILLNESS
[facility] #[id number]    71 yr old male with hx of HTNx presented to ER for worsening fever and sob for past 2 days. Patient reports having flu like symptoms for past 1 week days, associated with dec PO intake and appetite Patient has been taking Tylenol/Advil for symptomatic relief. Today complaining of sob on exertion and worsening dry cough prompting ER visit. Pt not vaccinated.  [facility] #[id number]    71 yr old male with hx of HTN presented to ER for worsening fever and sob for past 2 days. Patient reports having flu like symptoms for past 1 week days, associated with dec PO intake and appetite Patient has been taking Tylenol/Advil for symptomatic relief. Today complaining of sob on exertion and worsening dry cough prompting ER visit. Pt not vaccinated.

## 2021-12-03 NOTE — ED PROVIDER NOTE - PHYSICAL EXAMINATION
CONSTITUTIONAL: Well-appearing; well-nourished; in no apparent distress.   EYES: PERRL; EOM intact.   ENT: normal nose; no rhinorrhea; normal pharynx with no tonsillar hypertrophy.   NECK: Supple; non-tender; no cervical lymphadenopathy. -  CARDIOVASCULAR: Normal S1, S2; no murmurs, rubs, or gallops.   RESPIRATORY: O2 93% on 3L nc. No tachypnea. Pt speaking full sentences. Normal chest excursion with respiration; breath sounds clear and equal bilaterally; no wheezes, rhonchi, or rales.  GI/: Normal bowel sounds; non-distended; non-tender; no palpable organomegaly.   MS: No evidence of trauma or deformity. Normal ROM in all four extremities; non-tender to palpation; distal pulses are normal.   Extrem: no peripheral edema. No calf ttp  SKIN: Normal for age and race; warm; dry; good turgor; no apparent lesions or exudate.   NEURO/PSYCH: A & O x 4; grossly unremarkable. mood and manner are appropriate.

## 2021-12-03 NOTE — ED PROVIDER NOTE - OBJECTIVE STATEMENT
71 year old M with hx of htn presenting to er for eval. Sts has had 1 week of decreased po intake/ decreased appetite. Pt reports fever in ed. Pt not utd with covid vaccine. Denies chest pain, sob, cough, sore throat, runny nose, n/v/abd pain, diarrhea, urinary symptoms, sick contacts, recent travel, tobacco use.     hx obtained via  [facility] #[id number]

## 2021-12-03 NOTE — ED ADULT NURSE NOTE - BEFAST EYES
"Chief Complaint   Patient presents with     Medicare Visit     annual     Previous A1C is at goal of <8  Lab Results   Component Value Date    A1C 6.5 03/23/2021    A1C 6.3 04/10/2019    A1C 6.1 12/05/2018     Urine microalbumin:creatine: 03/23/2021  Foot exam 12/15/2018  Eye exam 07/20/20    Tobacco User no  Patient is on a daily aspirin  Patient is on a Statin.  Blood pressure today of:     BP Readings from Last 1 Encounters:   03/23/21 132/82      is at the goal of <139/89 for diabetics.    Olivia Jean-Baptiste LPN on 3/23/2021 at 3:23 PM        Initial /82   Pulse 79   Temp 98.1  F (36.7  C) (Temporal)   Resp 16   Ht 1.575 m (5' 2\")   Wt 80.3 kg (177 lb)   SpO2 98%   Breastfeeding No   BMI 32.37 kg/m   Estimated body mass index is 32.37 kg/m  as calculated from the following:    Height as of this encounter: 1.575 m (5' 2\").    Weight as of this encounter: 80.3 kg (177 lb).  Medication Reconciliation: complete    Olivia Jean-Baptiste LPN  " No

## 2021-12-03 NOTE — ED PROVIDER NOTE - PROGRESS NOTE DETAILS
Santi: received signout from KRISTA Martell.  CT head unremarkable  Pt stable on 3L NC satting 94-95%  Will admit [facility]. Endorsed to [facility] hospitalist Dr. Heath - aware that pt to be admitted to [facility] for COVID hypoxia (not vaccinated).

## 2021-12-03 NOTE — ED PROVIDER NOTE - NS ED ROS FT
Constitutional: + decreased appetite, decreased po intake.  no fever, chills  Eyes: no redness/discharge/pain/vision changes  ENT: no rhinorrhea/ear pain/sore throat  Cardiac: No chest pain, SOB or edema.  Respiratory: No cough or respiratory distress  GI: No nausea, vomiting, diarrhea or abdominal pain.  : No dysuria, frequency, urgency or hematuria  MS: no pain to back or extremities, no loss of ROM, no weakness  Neuro: No headache or weakness. No LOC.  Skin: No skin rash.  Endocrine: No history of thyroid disease or diabetes.  Except as documented in the HPI, all other systems are negative.

## 2021-12-03 NOTE — ED ADULT NURSE NOTE - NSIMPLEMENTINTERV_GEN_ALL_ED
Implemented All Fall with Harm Risk Interventions:  [place] to call system. Call bell, personal items and telephone within reach. Instruct patient to call for assistance. Room bathroom lighting operational. Non-slip footwear when patient is off stretcher. Physically safe environment: no spills, clutter or unnecessary equipment. Stretcher in lowest position, wheels locked, appropriate side rails in place. Provide visual cue, wrist band, yellow gown, etc. Monitor gait and stability. Monitor for mental status changes and reorient to person, place, and time. Review medications for side effects contributing to fall risk. Reinforce activity limits and safety measures with patient and family. Provide visual clues: red socks.

## 2021-12-04 LAB
ANION GAP SERPL CALC-SCNC: 16 MMOL/L — HIGH (ref 7–14)
BASOPHILS # BLD AUTO: 0.01 K/UL — SIGNIFICANT CHANGE UP (ref 0–0.2)
BASOPHILS NFR BLD AUTO: 0.3 % — SIGNIFICANT CHANGE UP (ref 0–1)
BUN SERPL-MCNC: 12 MG/DL — SIGNIFICANT CHANGE UP (ref 10–20)
CALCIUM SERPL-MCNC: 8.2 MG/DL — LOW (ref 8.5–10.1)
CHLORIDE SERPL-SCNC: 95 MMOL/L — LOW (ref 98–110)
CO2 SERPL-SCNC: 20 MMOL/L — SIGNIFICANT CHANGE UP (ref 17–32)
CREAT SERPL-MCNC: 0.8 MG/DL — SIGNIFICANT CHANGE UP (ref 0.7–1.5)
D DIMER BLD IA.RAPID-MCNC: 340 NG/ML DDU — HIGH (ref 0–230)
EOSINOPHIL # BLD AUTO: 0 K/UL — SIGNIFICANT CHANGE UP (ref 0–0.7)
EOSINOPHIL NFR BLD AUTO: 0 % — SIGNIFICANT CHANGE UP (ref 0–8)
GLUCOSE SERPL-MCNC: 101 MG/DL — HIGH (ref 70–99)
HCT VFR BLD CALC: 37.1 % — LOW (ref 42–52)
HGB BLD-MCNC: 12.6 G/DL — LOW (ref 14–18)
IMM GRANULOCYTES NFR BLD AUTO: 0.6 % — HIGH (ref 0.1–0.3)
LYMPHOCYTES # BLD AUTO: 0.71 K/UL — LOW (ref 1.2–3.4)
LYMPHOCYTES # BLD AUTO: 19.7 % — LOW (ref 20.5–51.1)
MAGNESIUM SERPL-MCNC: 2.1 MG/DL — SIGNIFICANT CHANGE UP (ref 1.8–2.4)
MCHC RBC-ENTMCNC: 29.4 PG — SIGNIFICANT CHANGE UP (ref 27–31)
MCHC RBC-ENTMCNC: 34 G/DL — SIGNIFICANT CHANGE UP (ref 32–37)
MCV RBC AUTO: 86.7 FL — SIGNIFICANT CHANGE UP (ref 80–94)
MONOCYTES # BLD AUTO: 0.44 K/UL — SIGNIFICANT CHANGE UP (ref 0.1–0.6)
MONOCYTES NFR BLD AUTO: 12.2 % — HIGH (ref 1.7–9.3)
NEUTROPHILS # BLD AUTO: 2.43 K/UL — SIGNIFICANT CHANGE UP (ref 1.4–6.5)
NEUTROPHILS NFR BLD AUTO: 67.2 % — SIGNIFICANT CHANGE UP (ref 42.2–75.2)
NRBC # BLD: 0 /100 WBCS — SIGNIFICANT CHANGE UP (ref 0–0)
OSMOLALITY SERPL: 278 MOS/KG — LOW (ref 280–301)
PLATELET # BLD AUTO: 195 K/UL — SIGNIFICANT CHANGE UP (ref 130–400)
POTASSIUM SERPL-MCNC: 4.4 MMOL/L — SIGNIFICANT CHANGE UP (ref 3.5–5)
POTASSIUM SERPL-SCNC: 4.4 MMOL/L — SIGNIFICANT CHANGE UP (ref 3.5–5)
PROCALCITONIN SERPL-MCNC: 0.11 NG/ML — HIGH (ref 0.02–0.1)
RBC # BLD: 4.28 M/UL — LOW (ref 4.7–6.1)
RBC # FLD: 13.9 % — SIGNIFICANT CHANGE UP (ref 11.5–14.5)
SODIUM SERPL-SCNC: 131 MMOL/L — LOW (ref 135–146)
WBC # BLD: 3.61 K/UL — LOW (ref 4.8–10.8)
WBC # FLD AUTO: 3.61 K/UL — LOW (ref 4.8–10.8)

## 2021-12-04 RX ORDER — REMDESIVIR 5 MG/ML
100 INJECTION INTRAVENOUS EVERY 24 HOURS
Refills: 0 | Status: COMPLETED | OUTPATIENT
Start: 2021-12-05 | End: 2021-12-07

## 2021-12-04 RX ORDER — SODIUM CHLORIDE 9 MG/ML
1000 INJECTION INTRAMUSCULAR; INTRAVENOUS; SUBCUTANEOUS
Refills: 0 | Status: DISCONTINUED | OUTPATIENT
Start: 2021-12-04 | End: 2021-12-06

## 2021-12-04 RX ORDER — REMDESIVIR 5 MG/ML
200 INJECTION INTRAVENOUS EVERY 24 HOURS
Refills: 0 | Status: COMPLETED | OUTPATIENT
Start: 2021-12-04 | End: 2021-12-04

## 2021-12-04 RX ORDER — INFLUENZA VIRUS VACCINE 15; 15; 15; 15 UG/.5ML; UG/.5ML; UG/.5ML; UG/.5ML
0.7 SUSPENSION INTRAMUSCULAR ONCE
Refills: 0 | Status: ACTIVE | OUTPATIENT
Start: 2021-12-04 | End: 2022-11-02

## 2021-12-04 RX ADMIN — AMLODIPINE BESYLATE 5 MILLIGRAM(S): 2.5 TABLET ORAL at 05:45

## 2021-12-04 RX ADMIN — ENOXAPARIN SODIUM 40 MILLIGRAM(S): 100 INJECTION SUBCUTANEOUS at 05:43

## 2021-12-04 RX ADMIN — REMDESIVIR 500 MILLIGRAM(S): 5 INJECTION INTRAVENOUS at 23:15

## 2021-12-04 RX ADMIN — ENOXAPARIN SODIUM 40 MILLIGRAM(S): 100 INJECTION SUBCUTANEOUS at 20:37

## 2021-12-04 RX ADMIN — SODIUM CHLORIDE 75 MILLILITER(S): 9 INJECTION INTRAMUSCULAR; INTRAVENOUS; SUBCUTANEOUS at 02:32

## 2021-12-04 RX ADMIN — REMDESIVIR 200 MILLIGRAM(S): 5 INJECTION INTRAVENOUS at 12:26

## 2021-12-04 RX ADMIN — Medication 6 MILLIGRAM(S): at 05:43

## 2021-12-04 RX ADMIN — SODIUM CHLORIDE 70 MILLILITER(S): 9 INJECTION INTRAMUSCULAR; INTRAVENOUS; SUBCUTANEOUS at 13:34

## 2021-12-04 NOTE — CONSULT NOTE ADULT - SUBJECTIVE AND OBJECTIVE BOX
RODEMARCO DUPREE  71y, Male  Allergy: No Known Allergies      CHIEF COMPLAINT:   sob (03 Dec 2021 22:01)      LOS  1d    HPI  HPI:   Eduin #[id number]    71 yr old male with hx of HTN presented to ER for worsening fever and sob for past 2 days, symptoms x 1 week. Patient reports having flu like symptoms for past 1 week, associated with dec PO intake and appetite Patient has been taking Tylenol/Advil for symptomatic relief. Today complaining of sob on exertion and worsening dry cough prompting ER visit. Pt not vaccinated. (03 Dec 2021 22:01)      INFECTIOUS DISEASE HISTORY:  Satting 94 on 3L  CXR bilateral opacities   D-Dimer Assay, Quantitative: 340 ng/mL DDU (12-04-21 @ 04:30)      PMH  PAST MEDICAL & SURGICAL HISTORY:  Hypertension    No significant past surgical history        FAMILY HISTORY  non-contributory     SOCIAL HISTORY  Social History:  Substance Use (street drugs): ( x ) never used  (  ) other:  Tobacco Usage:  ( x  ) never smoked   (   ) former smoker   (   ) current smoker  (     ) pack year  Alcohol Usage: None (03 Dec 2021 22:01)        ROS  General: Denies rigors, nightsweats  HEENT: Denies headache, rhinorrhea, sore throat, eye pain  CV: Denies CP, palpitations  PULM: Denies wheezing, hemoptysis  GI: Denies hematemesis, hematochezia, melena  : Denies discharge, hematuria  MSK: Denies arthralgias, myalgias  SKIN: Denies rash, lesions  NEURO: Denies paresthesias, weakness  PSYCH: Denies depression, anxiety    VITALS:  T(F): 97.7, Max: 101.6 (12-03-21 @ 17:47)  HR: 64  BP: 104/61  RR: 18Vital Signs Last 24 Hrs  T(C): 36.5 (04 Dec 2021 04:41), Max: 38.7 (03 Dec 2021 17:47)  T(F): 97.7 (04 Dec 2021 04:41), Max: 101.6 (03 Dec 2021 17:47)  HR: 64 (04 Dec 2021 04:41) (63 - 88)  BP: 104/61 (04 Dec 2021 04:41) (104/61 - 131/69)  BP(mean): --  RR: 18 (04 Dec 2021 04:41) (17 - 22)  SpO2: 94% (04 Dec 2021 04:41) (88% - 95%)      TESTS & MEASUREMENTS:                        12.6   3.61  )-----------( 195      ( 04 Dec 2021 04:30 )             37.1     12-04    131<L>  |  95<L>  |  12  ----------------------------<  101<H>  4.4   |  20  |  0.8    Ca    8.2<L>      04 Dec 2021 04:30  Mg     2.1     12-04    TPro  6.3  /  Alb  3.9  /  TBili  0.6  /  DBili  x   /  AST  52<H>  /  ALT  32  /  AlkPhos  70  12-03    eGFR if Non African American: 89 mL/min/1.73M2 (12-04-21 @ 04:30)  eGFR if African American: 103 mL/min/1.73M2 (12-04-21 @ 04:30)  eGFR if Non African American: 84 mL/min/1.73M2 (12-03-21 @ 19:00)  eGFR if : 98 mL/min/1.73M2 (12-03-21 @ 19:00)    LIVER FUNCTIONS - ( 03 Dec 2021 19:00 )  Alb: 3.9 g/dL / Pro: 6.3 g/dL / ALK PHOS: 70 U/L / ALT: 32 U/L / AST: 52 U/L / GGT: x                 Blood Gas Venous - Lactate: 1.40 mmol/L (12-03-21 @ 20:02)      INFECTIOUS DISEASES TESTING  Rapid RVP Result: Detected (12-03-21 @ 18:53)      INFLAMMATORY MARKERS      RADIOLOGY & ADDITIONAL TESTS:  I have personally reviewed the last Chest xray  CXR      CT      CARDIOLOGY TESTING  12 Lead ECG:   Ventricular Rate 80 BPM    Atrial Rate 80 BPM    P-R Interval 142 ms    QRS Duration 94 ms    Q-T Interval 382 ms    QTC Calculation(Bazett) 440 ms    P Axis 58 degrees    R Axis 40 degrees    T Axis 53 degrees    Diagnosis Line Normal sinus rhythm    Confirmed by BRIGHT PANDA MD ([id number]) on 12/4/2021 7:48:30 AM (12-03-21 @ 18:35)      MEDICATIONS  amLODIPine   Tablet 5 Oral daily  dexAMETHasone     Tablet 6 Oral daily  enoxaparin Injectable 40 SubCutaneous every 12 hours  influenza  Vaccine (HIGH DOSE) 0.7 IntraMuscular once  remdesivir  IVPB  IV Intermittent   remdesivir  IVPB 200 IV Intermittent every 24 hours  sodium chloride 0.9%. 1000 IV Continuous <Continuous>      Weight  Weight (kg): 90.7 (12-03-21 @ 19:02)    ANTIBIOTICS:  remdesivir  IVPB   IV Intermittent   remdesivir  IVPB 200 milliGRAM(s) IV Intermittent every 24 hours      ALLERGIES:  No Known Allergies           NISHADEMARCO  71y, Male  Allergy: No Known Allergies      CHIEF COMPLAINT:   sob (03 Dec 2021 22:01)      LOS  1d    HPI  HPI:   Eduin #[id number]    71 yr old male with hx of HTN presented to ER for worsening fever and sob for past 2 days, symptoms x 1 week. Patient reports having flu like symptoms for past 1 week, associated with dec PO intake and appetite Patient has been taking Tylenol/Advil for symptomatic relief. Today complaining of sob on exertion and worsening dry cough prompting ER visit. Pt not vaccinated. (03 Dec 2021 22:01)      INFECTIOUS DISEASE HISTORY:  Satting 94 on 3L  CXR bilateral opacities   D-Dimer Assay, Quantitative: 340 ng/mL DDU (12-04-21 @ 04:30)  no vaccine    PMH  PAST MEDICAL & SURGICAL HISTORY:  Hypertension    No significant past surgical history        FAMILY HISTORY  non-contributory     SOCIAL HISTORY  Social History:  Substance Use (street drugs): ( x ) never used  (  ) other:  Tobacco Usage:  ( x  ) never smoked   (   ) former smoker   (   ) current smoker  (     ) pack year  Alcohol Usage: None (03 Dec 2021 22:01)        ROS  General: Denies rigors, nightsweats  HEENT: Denies headache, rhinorrhea, sore throat, eye pain  CV: Denies CP, palpitations  PULM: Denies wheezing, hemoptysis  GI: Denies hematemesis, hematochezia, melena  : Denies discharge, hematuria  MSK: Denies arthralgias, myalgias  SKIN: Denies rash, lesions  NEURO: Denies paresthesias, weakness  PSYCH: Denies depression, anxiety    VITALS:  T(F): 97.7, Max: 101.6 (12-03-21 @ 17:47)  HR: 64  BP: 104/61  RR: 18Vital Signs Last 24 Hrs  T(C): 36.5 (04 Dec 2021 04:41), Max: 38.7 (03 Dec 2021 17:47)  T(F): 97.7 (04 Dec 2021 04:41), Max: 101.6 (03 Dec 2021 17:47)  HR: 64 (04 Dec 2021 04:41) (63 - 88)  BP: 104/61 (04 Dec 2021 04:41) (104/61 - 131/69)  BP(mean): --  RR: 18 (04 Dec 2021 04:41) (17 - 22)  SpO2: 94% (04 Dec 2021 04:41) (88% - 95%)    Gen: on NC  HEENT: NCAT  Resp: b/l chest expansion  Neuro: nonfocal      TESTS & MEASUREMENTS:                        12.6   3.61  )-----------( 195      ( 04 Dec 2021 04:30 )             37.1     12-04    131<L>  |  95<L>  |  12  ----------------------------<  101<H>  4.4   |  20  |  0.8    Ca    8.2<L>      04 Dec 2021 04:30  Mg     2.1     12-04    TPro  6.3  /  Alb  3.9  /  TBili  0.6  /  DBili  x   /  AST  52<H>  /  ALT  32  /  AlkPhos  70  12-03    eGFR if Non African American: 89 mL/min/1.73M2 (12-04-21 @ 04:30)  eGFR if African American: 103 mL/min/1.73M2 (12-04-21 @ 04:30)  eGFR if Non African American: 84 mL/min/1.73M2 (12-03-21 @ 19:00)  eGFR if : 98 mL/min/1.73M2 (12-03-21 @ 19:00)    LIVER FUNCTIONS - ( 03 Dec 2021 19:00 )  Alb: 3.9 g/dL / Pro: 6.3 g/dL / ALK PHOS: 70 U/L / ALT: 32 U/L / AST: 52 U/L / GGT: x                 Blood Gas Venous - Lactate: 1.40 mmol/L (12-03-21 @ 20:02)      INFECTIOUS DISEASES TESTING  Rapid RVP Result: Detected (12-03-21 @ 18:53)      INFLAMMATORY MARKERS      RADIOLOGY & ADDITIONAL TESTS:  I have personally reviewed the last Chest xray  CXR      CT      CARDIOLOGY TESTING  12 Lead ECG:   Ventricular Rate 80 BPM    Atrial Rate 80 BPM    P-R Interval 142 ms    QRS Duration 94 ms    Q-T Interval 382 ms    QTC Calculation(Bazett) 440 ms    P Axis 58 degrees    R Axis 40 degrees    T Axis 53 degrees    Diagnosis Line Normal sinus rhythm    Confirmed by BRIGHT PANDA MD ([id number]) on 12/4/2021 7:48:30 AM (12-03-21 @ 18:35)      MEDICATIONS  amLODIPine   Tablet 5 Oral daily  dexAMETHasone     Tablet 6 Oral daily  enoxaparin Injectable 40 SubCutaneous every 12 hours  influenza  Vaccine (HIGH DOSE) 0.7 IntraMuscular once  remdesivir  IVPB  IV Intermittent   remdesivir  IVPB 200 IV Intermittent every 24 hours  sodium chloride 0.9%. 1000 IV Continuous <Continuous>      Weight  Weight (kg): 90.7 (12-03-21 @ 19:02)    ANTIBIOTICS:  remdesivir  IVPB   IV Intermittent   remdesivir  IVPB 200 milliGRAM(s) IV Intermittent every 24 hours      ALLERGIES:  No Known Allergies

## 2021-12-04 NOTE — CONSULT NOTE ADULT - ASSESSMENT
ASSESSMENT  71 yr old male with hx of HTN presented to ER for worsening fever and sob for past 2 days, symptoms x 1 week    IMPRESSION  #COVID19 PNA, Severe (O2 < or = 94% on RA and requiring supplemental O2)  D-Dimer Assay, Quantitative: 340 ng/mL DDU (12-04-21 @ 04:30)    #Sepsis on admission  T>101F, Resp Rate>20  #Hyponatremia   #Obesity BMI (kg/m2): 33.7  Creatinine, Serum: 0.8 (12-04-21 @ 04:30)      RECOMMENDATIONS  This is an incomplete consult note. All recommendations to follow after interview and examination of the patient.   - Remdesivir D1: 200mg x1, Day 2 - Day 5 100mg IV daily. Monitor Cr/LFTs  - Dexamethasone 6mg x 10 days or until Discharge (whichever is shorter), any taper per Pulm  - ONLY If requiring NRB, HFNC, or BIPAP and worsening oxygenation thought to be 2/2 COVID-19--> Tocilizumab x1 (Wt <65 kg= 400 mg, 65-90 kg =600 mg, >90 kg =800 mg)  - A/C per primary team  - Prone positioning if possible  - Monitor off Abx     If any questions, please call or send a message on [employer] Teams  Spectra [phone] ASSESSMENT  71 yr old male with hx of HTN presented to ER for worsening fever and sob for past 2 days, symptoms x 1 week    IMPRESSION  #COVID19 PNA, Severe (O2 < or = 94% on RA and requiring supplemental O2)  D-Dimer Assay, Quantitative: 340 ng/mL DDU (12-04-21 @ 04:30)    #Sepsis on admission  T>101F, Resp Rate>20  #Hyponatremia   #Obesity BMI (kg/m2): 33.7  Creatinine, Serum: 0.8 (12-04-21 @ 04:30)      RECOMMENDATIONS  - Remdesivir D1: 200mg x1, Day 2 - Day 5 100mg IV daily. Monitor Cr/LFTs  - Dexamethasone 6mg x 10 days or until Discharge (whichever is shorter), any taper per Pulm  - ONLY If requiring NRB, HFNC, or BIPAP and worsening oxygenation thought to be 2/2 COVID-19--> Tocilizumab x1 (Wt <65 kg= 400 mg, 65-90 kg =600 mg, >90 kg =800 mg)  - A/C per primary team  - Prone positioning if possible  - Monitor off Abx     If any questions, please call or send a message on [employer] Teams  Spectra [phone]

## 2021-12-04 NOTE — PATIENT PROFILE ADULT - FALL HARM RISK - HARM RISK INTERVENTIONS

## 2021-12-05 LAB
ALBUMIN SERPL ELPH-MCNC: 3.5 G/DL — SIGNIFICANT CHANGE UP (ref 3.5–5.2)
ALP SERPL-CCNC: 69 U/L — SIGNIFICANT CHANGE UP (ref 30–115)
ALT FLD-CCNC: 50 U/L — HIGH (ref 0–41)
ANION GAP SERPL CALC-SCNC: 18 MMOL/L — HIGH (ref 7–14)
AST SERPL-CCNC: 48 U/L — HIGH (ref 0–41)
BILIRUB SERPL-MCNC: 0.4 MG/DL — SIGNIFICANT CHANGE UP (ref 0.2–1.2)
BUN SERPL-MCNC: 12 MG/DL — SIGNIFICANT CHANGE UP (ref 10–20)
CALCIUM SERPL-MCNC: 7.9 MG/DL — LOW (ref 8.5–10.1)
CHLORIDE SERPL-SCNC: 100 MMOL/L — SIGNIFICANT CHANGE UP (ref 98–110)
CO2 SERPL-SCNC: 14 MMOL/L — LOW (ref 17–32)
CREAT SERPL-MCNC: 0.7 MG/DL — SIGNIFICANT CHANGE UP (ref 0.7–1.5)
D DIMER BLD IA.RAPID-MCNC: 178 NG/ML DDU — SIGNIFICANT CHANGE UP (ref 0–230)
GLUCOSE SERPL-MCNC: 201 MG/DL — HIGH (ref 70–99)
HCT VFR BLD CALC: 36.5 % — LOW (ref 42–52)
HGB BLD-MCNC: 12.4 G/DL — LOW (ref 14–18)
MCHC RBC-ENTMCNC: 30 PG — SIGNIFICANT CHANGE UP (ref 27–31)
MCHC RBC-ENTMCNC: 34 G/DL — SIGNIFICANT CHANGE UP (ref 32–37)
MCV RBC AUTO: 88.2 FL — SIGNIFICANT CHANGE UP (ref 80–94)
NRBC # BLD: 0 /100 WBCS — SIGNIFICANT CHANGE UP (ref 0–0)
PLATELET # BLD AUTO: 274 K/UL — SIGNIFICANT CHANGE UP (ref 130–400)
POTASSIUM SERPL-MCNC: 4.2 MMOL/L — SIGNIFICANT CHANGE UP (ref 3.5–5)
POTASSIUM SERPL-SCNC: 4.2 MMOL/L — SIGNIFICANT CHANGE UP (ref 3.5–5)
PROT SERPL-MCNC: 5.9 G/DL — LOW (ref 6–8)
RBC # BLD: 4.14 M/UL — LOW (ref 4.7–6.1)
RBC # FLD: 13.9 % — SIGNIFICANT CHANGE UP (ref 11.5–14.5)
SODIUM SERPL-SCNC: 132 MMOL/L — LOW (ref 135–146)
WBC # BLD: 6.58 K/UL — SIGNIFICANT CHANGE UP (ref 4.8–10.8)
WBC # FLD AUTO: 6.58 K/UL — SIGNIFICANT CHANGE UP (ref 4.8–10.8)

## 2021-12-05 PROCEDURE — 71045 X-RAY EXAM CHEST 1 VIEW: CPT | Mod: 26

## 2021-12-05 PROCEDURE — 99233 SBSQ HOSP IP/OBS HIGH 50: CPT

## 2021-12-05 RX ADMIN — AMLODIPINE BESYLATE 5 MILLIGRAM(S): 2.5 TABLET ORAL at 05:56

## 2021-12-05 RX ADMIN — ENOXAPARIN SODIUM 40 MILLIGRAM(S): 100 INJECTION SUBCUTANEOUS at 17:13

## 2021-12-05 RX ADMIN — REMDESIVIR 500 MILLIGRAM(S): 5 INJECTION INTRAVENOUS at 23:07

## 2021-12-05 RX ADMIN — ENOXAPARIN SODIUM 40 MILLIGRAM(S): 100 INJECTION SUBCUTANEOUS at 05:56

## 2021-12-05 RX ADMIN — Medication 6 MILLIGRAM(S): at 05:56

## 2021-12-05 NOTE — PROGRESS NOTE ADULT - ASSESSMENT
71 yr old male with hx of HTN admitted for COVID PNA    # AHRF secondary to  # COVID 19 PNA  - Symptomatic since around 11/30.   - On admission requiring 3L NC  - CXR: b/l infiltrates  - Markers:  D-dimer 340    - RDV (since 12/4)  - IV dexa (since 12/4)    12/4: 3L NC.   12/5: 5-6 L NC. Draw markers @ 4PM. Repeat CXR. c/w RDV, Dexa. Encourage I/S, proning.     # Mild Transaminitis  - likely secondary to vial infection     # Hyponatremia   - improved from 128 > 131 (12/4) after IVFs. Monitor.     # HTN  - c/w amlodipine 5mg     # DASH diet     # DVT ppx   - start Lovenox    # Ambulate as tolerated    # Full code

## 2021-12-05 NOTE — PROGRESS NOTE ADULT - SUBJECTIVE AND OBJECTIVE BOX
S: +JOHN. Some cough  No cp/n/v/d      All other pertinent ROS negative.      12-04-21 @ 07:01  -  12-05-21 @ 07:00  --------------------------------------------------------  IN: 0 mL / OUT: 600 mL / NET: -600 mL      Vital Signs Last 24 Hrs  T(C): 36.6 (05 Dec 2021 12:00), Max: 36.9 (04 Dec 2021 20:30)  T(F): 97.9 (05 Dec 2021 12:00), Max: 98.5 (04 Dec 2021 20:30)  HR: 66 (05 Dec 2021 12:00) (64 - 75)  BP: 114/59 (05 Dec 2021 12:00) (114/59 - 140/72)  BP(mean): --  RR: 18 (05 Dec 2021 12:00) (18 - 20)  SpO2: 94% (05 Dec 2021 12:00) (86% - 94%)  PHYSICAL EXAM:    Constitutional: NAD, awake and alert, well-developed  HEENT: PERR, EOMI, Normal Hearing, MMM  Neck: Soft and supple, No LAD, No JVD  Respiratory: Breath sounds are clear bilaterally, No wheezing, rales. occ rhonchi  Cardiovascular: S1 and S2, regular rate and rhythm, no Murmurs, gallops or rubs  Gastrointestinal: Bowel Sounds present, soft, nontender, nondistended, no guarding, no rebound  Extremities: No peripheral edema      MEDICATIONS:  MEDICATIONS  (STANDING):  amLODIPine   Tablet 5 milliGRAM(s) Oral daily  dexAMETHasone     Tablet 6 milliGRAM(s) Oral daily  enoxaparin Injectable 40 milliGRAM(s) SubCutaneous every 12 hours  influenza  Vaccine (HIGH DOSE) 0.7 milliLiter(s) IntraMuscular once  remdesivir  IVPB   IV Intermittent   remdesivir  IVPB 100 milliGRAM(s) IV Intermittent every 24 hours  sodium chloride 0.9%. 1000 milliLiter(s) (70 mL/Hr) IV Continuous <Continuous>      LABS: All Labs Reviewed:                        12.6   3.61  )-----------( 195      ( 04 Dec 2021 04:30 )             37.1     12-04    131<L>  |  95<L>  |  12  ----------------------------<  101<H>  4.4   |  20  |  0.8    Ca    8.2<L>      04 Dec 2021 04:30  Mg     2.1     12-04    TPro  6.3  /  Alb  3.9  /  TBili  0.6  /  DBili  x   /  AST  52<H>  /  ALT  32  /  AlkPhos  70  12-03      CARDIAC MARKERS ( 03 Dec 2021 19:00 )  x     / <0.01 ng/mL / x     / x     / x          Blood Culture:     Radiology: reviewed

## 2021-12-06 LAB
ALBUMIN SERPL ELPH-MCNC: 3.5 G/DL — SIGNIFICANT CHANGE UP (ref 3.5–5.2)
ALP SERPL-CCNC: 70 U/L — SIGNIFICANT CHANGE UP (ref 30–115)
ALT FLD-CCNC: 43 U/L — HIGH (ref 0–41)
ANION GAP SERPL CALC-SCNC: 16 MMOL/L — HIGH (ref 7–14)
AST SERPL-CCNC: 36 U/L — SIGNIFICANT CHANGE UP (ref 0–41)
BILIRUB SERPL-MCNC: 0.5 MG/DL — SIGNIFICANT CHANGE UP (ref 0.2–1.2)
BUN SERPL-MCNC: 12 MG/DL — SIGNIFICANT CHANGE UP (ref 10–20)
CALCIUM SERPL-MCNC: 8.4 MG/DL — LOW (ref 8.5–10.1)
CHLORIDE SERPL-SCNC: 98 MMOL/L — SIGNIFICANT CHANGE UP (ref 98–110)
CO2 SERPL-SCNC: 17 MMOL/L — SIGNIFICANT CHANGE UP (ref 17–32)
CREAT SERPL-MCNC: 0.7 MG/DL — SIGNIFICANT CHANGE UP (ref 0.7–1.5)
CRP SERPL-MCNC: 65 MG/L — HIGH
FERRITIN SERPL-MCNC: 1034 NG/ML — HIGH (ref 30–400)
GLUCOSE SERPL-MCNC: 176 MG/DL — HIGH (ref 70–99)
HCT VFR BLD CALC: 36.8 % — LOW (ref 42–52)
HCV AB S/CO SERPL IA: 0.04 COI — SIGNIFICANT CHANGE UP
HCV AB SERPL-IMP: SIGNIFICANT CHANGE UP
HGB BLD-MCNC: 12.6 G/DL — LOW (ref 14–18)
MAGNESIUM SERPL-MCNC: 1.9 MG/DL — SIGNIFICANT CHANGE UP (ref 1.8–2.4)
MCHC RBC-ENTMCNC: 29.8 PG — SIGNIFICANT CHANGE UP (ref 27–31)
MCHC RBC-ENTMCNC: 34.2 G/DL — SIGNIFICANT CHANGE UP (ref 32–37)
MCV RBC AUTO: 87 FL — SIGNIFICANT CHANGE UP (ref 80–94)
NRBC # BLD: 0 /100 WBCS — SIGNIFICANT CHANGE UP (ref 0–0)
PLATELET # BLD AUTO: 311 K/UL — SIGNIFICANT CHANGE UP (ref 130–400)
POTASSIUM SERPL-MCNC: 4.1 MMOL/L — SIGNIFICANT CHANGE UP (ref 3.5–5)
POTASSIUM SERPL-SCNC: 4.1 MMOL/L — SIGNIFICANT CHANGE UP (ref 3.5–5)
PROT SERPL-MCNC: 5.9 G/DL — LOW (ref 6–8)
RBC # BLD: 4.23 M/UL — LOW (ref 4.7–6.1)
RBC # FLD: 14 % — SIGNIFICANT CHANGE UP (ref 11.5–14.5)
SODIUM SERPL-SCNC: 131 MMOL/L — LOW (ref 135–146)
WBC # BLD: 8.14 K/UL — SIGNIFICANT CHANGE UP (ref 4.8–10.8)
WBC # FLD AUTO: 8.14 K/UL — SIGNIFICANT CHANGE UP (ref 4.8–10.8)

## 2021-12-06 PROCEDURE — 99233 SBSQ HOSP IP/OBS HIGH 50: CPT

## 2021-12-06 RX ADMIN — ENOXAPARIN SODIUM 40 MILLIGRAM(S): 100 INJECTION SUBCUTANEOUS at 18:34

## 2021-12-06 RX ADMIN — REMDESIVIR 500 MILLIGRAM(S): 5 INJECTION INTRAVENOUS at 23:13

## 2021-12-06 RX ADMIN — AMLODIPINE BESYLATE 5 MILLIGRAM(S): 2.5 TABLET ORAL at 05:56

## 2021-12-06 RX ADMIN — ENOXAPARIN SODIUM 40 MILLIGRAM(S): 100 INJECTION SUBCUTANEOUS at 05:56

## 2021-12-06 RX ADMIN — Medication 6 MILLIGRAM(S): at 05:56

## 2021-12-06 NOTE — PROGRESS NOTE ADULT - SUBJECTIVE AND OBJECTIVE BOX
S: NAD at rest. Proning, using I/s  JOHN+ cough+ fever- No n/v/d      All other pertinent ROS negative.      12-05-21 @ 07:01  -  12-06-21 @ 07:00  --------------------------------------------------------  IN: 0 mL / OUT: 1800 mL / NET: -1800 mL    12-06-21 @ 07:01  -  12-06-21 @ 14:19  --------------------------------------------------------  IN: 210 mL / OUT: 300 mL / NET: -90 mL      Vital Signs Last 24 Hrs  T(C): 35.7 (06 Dec 2021 11:20), Max: 36.4 (05 Dec 2021 21:03)  T(F): 96.3 (06 Dec 2021 11:20), Max: 97.6 (05 Dec 2021 21:03)  HR: 73 (06 Dec 2021 11:20) (60 - 73)  BP: 121/69 (06 Dec 2021 11:20) (121/69 - 129/71)  BP(mean): --  RR: 19 (06 Dec 2021 11:20) (18 - 19)  SpO2: 91% (06 Dec 2021 11:20) (91% - 93%)  PHYSICAL EXAM:    Constitutional: NAD, awake and alert, well-developed  HEENT: PERR, EOMI, Normal Hearing, MMM  Neck: Soft and supple, No LAD, No JVD  Respiratory: Breath sounds are clear bilaterally, No wheezing, rales. occ rhonchi  Cardiovascular: S1 and S2, regular rate and rhythm, no Murmurs, gallops or rubs  Gastrointestinal: Bowel Sounds present, soft, nontender, nondistended, no guarding, no rebound  Extremities: No peripheral edema      MEDICATIONS:  MEDICATIONS  (STANDING):  amLODIPine   Tablet 5 milliGRAM(s) Oral daily  dexAMETHasone     Tablet 6 milliGRAM(s) Oral daily  enoxaparin Injectable 40 milliGRAM(s) SubCutaneous every 12 hours  influenza  Vaccine (HIGH DOSE) 0.7 milliLiter(s) IntraMuscular once  remdesivir  IVPB   IV Intermittent   remdesivir  IVPB 100 milliGRAM(s) IV Intermittent every 24 hours      LABS: All Labs Reviewed:                        12.6   8.14  )-----------( 311      ( 06 Dec 2021 04:30 )             36.8     12-06    131<L>  |  98  |  12  ----------------------------<  176<H>  4.1   |  17  |  0.7    Ca    8.4<L>      06 Dec 2021 04:30  Mg     1.9     12-06    TPro  5.9<L>  /  Alb  3.5  /  TBili  0.5  /  DBili  x   /  AST  36  /  ALT  43<H>  /  AlkPhos  70  12-06          Blood Culture:     Radiology: reviewed

## 2021-12-06 NOTE — PROGRESS NOTE ADULT - ASSESSMENT
71 yr old male with hx of HTN admitted for COVID PNA    # AHRF secondary to  # COVID 19 PNA  - Symptomatic since around 11/30.   - On admission requiring 3L NC  - CXR: b/l infiltrates  - Markers:  D-dimer 340 > 175  CRP 65  Ferritin 1034  Procalc 0.11      - RDV (since 12/4)  - IV dexa (since 12/4)    12/4: 3L NC.   12/5: 5-6 L NC. Draw markers @ 4PM. Repeat CXR. c/w RDV, Dexa. Encourage I/S, proning.   12/6: 5L NC. c/w RDV, Dexa. Encourage I/S, proning.       # Mild Transaminitis  - likely secondary to viral infection     # Hyponatremia   - improved from 128 > IVFs > 131 (12/4) > 131 (12/6)   Watch off IVFs now.   ?SiADH    # HTN  - c/w amlodipine 5mg     # DASH diet     # DVT ppx   - start Lovenox    # Ambulate as tolerated    # Full code

## 2021-12-07 LAB
ALBUMIN SERPL ELPH-MCNC: 3.4 G/DL — LOW (ref 3.5–5.2)
ALP SERPL-CCNC: 66 U/L — SIGNIFICANT CHANGE UP (ref 30–115)
ALT FLD-CCNC: 42 U/L — HIGH (ref 0–41)
ANION GAP SERPL CALC-SCNC: 19 MMOL/L — HIGH (ref 7–14)
AST SERPL-CCNC: 32 U/L — SIGNIFICANT CHANGE UP (ref 0–41)
BILIRUB SERPL-MCNC: 0.5 MG/DL — SIGNIFICANT CHANGE UP (ref 0.2–1.2)
BUN SERPL-MCNC: 10 MG/DL — SIGNIFICANT CHANGE UP (ref 10–20)
CALCIUM SERPL-MCNC: 8.1 MG/DL — LOW (ref 8.5–10.1)
CHLORIDE SERPL-SCNC: 98 MMOL/L — SIGNIFICANT CHANGE UP (ref 98–110)
CO2 SERPL-SCNC: 16 MMOL/L — LOW (ref 17–32)
CREAT SERPL-MCNC: 0.7 MG/DL — SIGNIFICANT CHANGE UP (ref 0.7–1.5)
GLUCOSE SERPL-MCNC: 130 MG/DL — HIGH (ref 70–99)
HCT VFR BLD CALC: 36.8 % — LOW (ref 42–52)
HGB BLD-MCNC: 12.4 G/DL — LOW (ref 14–18)
MAGNESIUM SERPL-MCNC: 1.9 MG/DL — SIGNIFICANT CHANGE UP (ref 1.8–2.4)
MCHC RBC-ENTMCNC: 29.7 PG — SIGNIFICANT CHANGE UP (ref 27–31)
MCHC RBC-ENTMCNC: 33.7 G/DL — SIGNIFICANT CHANGE UP (ref 32–37)
MCV RBC AUTO: 88 FL — SIGNIFICANT CHANGE UP (ref 80–94)
NRBC # BLD: 0 /100 WBCS — SIGNIFICANT CHANGE UP (ref 0–0)
PLATELET # BLD AUTO: 336 K/UL — SIGNIFICANT CHANGE UP (ref 130–400)
POTASSIUM SERPL-MCNC: 4.3 MMOL/L — SIGNIFICANT CHANGE UP (ref 3.5–5)
POTASSIUM SERPL-SCNC: 4.3 MMOL/L — SIGNIFICANT CHANGE UP (ref 3.5–5)
PROT SERPL-MCNC: 5.7 G/DL — LOW (ref 6–8)
RBC # BLD: 4.18 M/UL — LOW (ref 4.7–6.1)
RBC # FLD: 13.5 % — SIGNIFICANT CHANGE UP (ref 11.5–14.5)
SODIUM SERPL-SCNC: 133 MMOL/L — LOW (ref 135–146)
WBC # BLD: 10.1 K/UL — SIGNIFICANT CHANGE UP (ref 4.8–10.8)
WBC # FLD AUTO: 10.1 K/UL — SIGNIFICANT CHANGE UP (ref 4.8–10.8)

## 2021-12-07 PROCEDURE — 99232 SBSQ HOSP IP/OBS MODERATE 35: CPT

## 2021-12-07 RX ORDER — SODIUM CHLORIDE 0.65 %
1 AEROSOL, SPRAY (ML) NASAL
Refills: 0 | Status: ACTIVE | OUTPATIENT
Start: 2021-12-07 | End: 2022-11-05

## 2021-12-07 RX ORDER — LANOLIN ALCOHOL/MO/W.PET/CERES
5 CREAM (GRAM) TOPICAL AT BEDTIME
Refills: 0 | Status: ACTIVE | OUTPATIENT
Start: 2021-12-07 | End: 2022-11-05

## 2021-12-07 RX ADMIN — REMDESIVIR 500 MILLIGRAM(S): 5 INJECTION INTRAVENOUS at 23:15

## 2021-12-07 RX ADMIN — ENOXAPARIN SODIUM 40 MILLIGRAM(S): 100 INJECTION SUBCUTANEOUS at 05:51

## 2021-12-07 RX ADMIN — ENOXAPARIN SODIUM 40 MILLIGRAM(S): 100 INJECTION SUBCUTANEOUS at 18:02

## 2021-12-07 RX ADMIN — Medication 5 MILLIGRAM(S): at 00:13

## 2021-12-07 RX ADMIN — Medication 6 MILLIGRAM(S): at 05:51

## 2021-12-07 RX ADMIN — AMLODIPINE BESYLATE 5 MILLIGRAM(S): 2.5 TABLET ORAL at 05:51

## 2021-12-07 NOTE — PROGRESS NOTE ADULT - ASSESSMENT
Assessment     71 yr old male with hx of HTN admitted for COVID PNA       #Acute hypoxemic respiratory failure 2/2 COVID 19 PNA  - Symptomatic since around 11/30.   - On admission requiring 3L NC, now 6L NC   - CXR: b/l infiltrates  - continue RDV (started 12/4)  - continue dexamethasone (started 12/4)   - trend inflammatory markers   - consider tocilizimab if worsening respiratory status   - D-dimer 340 > 178  - CRP 65  - Ferritin 1034  - Procalc 0.11      # Mild Transaminitis  - likely secondary to viral infection  - monitor CMP daily      # Hyponatremia - improving   - monitor off IVFs now.     # HTN  - c/w amlodipine 5mg       DVT ppx - lovenox 40mg bid   Diet - dash   activity - as tolerated   Code status-  Full code

## 2021-12-07 NOTE — PROGRESS NOTE ADULT - SUBJECTIVE AND OBJECTIVE BOX
DEMARCO CAMERON 71y Male  MRN#: [id number]   CODE STATUS: full code     Hospital Day: 4d    Pt is currently admitted with the primary diagnosis of covid     SUBJECTIVE    no acute events overnight denies fever chills nvd, abdominal pain, on 6l nc                                               ----------------------------------------------------------  OBJECTIVE  PAST MEDICAL & SURGICAL HISTORY  Hypertension    No significant past surgical history                                              -----------------------------------------------------------  ALLERGIES:  No Known Allergies                                            ------------------------------------------------------------    HOME MEDICATIONS  Home Medications:  amLODIPine 10 mg oral tablet: 1 tab(s) orally once a day (03 Dec 2021 19:09)  Vitamin B12 1000 mcg oral tablet: 1 tab(s) orally once a day (03 Dec 2021 19:09)  Vitamin D3:  (03 Dec 2021 19:09)                           MEDICATIONS:  STANDING MEDICATIONS  amLODIPine   Tablet 5 milliGRAM(s) Oral daily  dexAMETHasone     Tablet 6 milliGRAM(s) Oral daily  enoxaparin Injectable 40 milliGRAM(s) SubCutaneous every 12 hours  influenza  Vaccine (HIGH DOSE) 0.7 milliLiter(s) IntraMuscular once  melatonin 5 milliGRAM(s) Oral at bedtime  remdesivir  IVPB   IV Intermittent   remdesivir  IVPB 100 milliGRAM(s) IV Intermittent every 24 hours    PRN MEDICATIONS  acetaminophen     Tablet .. 650 milliGRAM(s) Oral every 8 hours PRN  guaiFENesin  milliGRAM(s) Oral every 12 hours PRN  sodium chloride 0.65% Nasal 1 Spray(s) Both Nostrils two times a day PRN                                            ------------------------------------------------------------  VITAL SIGNS: Last 24 Hours  T(C): 36.2 (07 Dec 2021 05:00), Max: 36.2 (06 Dec 2021 20:43)  T(F): 97.1 (07 Dec 2021 05:00), Max: 97.2 (06 Dec 2021 20:43)  HR: 53 (07 Dec 2021 05:00) (53 - 73)  BP: 128/61 (07 Dec 2021 05:00) (121/69 - 128/68)  BP(mean): --  RR: 18 (07 Dec 2021 08:51) (18 - 20)  SpO2: 95% (07 Dec 2021 08:51) (88% - 95%)      12-06-21 @ 07:01  -  12-07-21 @ 07:00  --------------------------------------------------------  IN: 446 mL / OUT: 1200 mL / NET: -754 mL                                             --------------------------------------------------------------  LABS:                        12.4   10.10 )-----------( 336      ( 07 Dec 2021 04:30 )             36.8     12-07    133<L>  |  98  |  10  ----------------------------<  130<H>  4.3   |  16<L>  |  0.7    Ca    8.1<L>      07 Dec 2021 04:30  Mg     1.9     12-07    TPro  5.7<L>  /  Alb  3.4<L>  /  TBili  0.5  /  DBili  x   /  AST  32  /  ALT  42<H>  /  AlkPhos  66  12-07                                                              -------------------------------------------------------------  RADIOLOGY:      EXAM:  XR CHEST PORTABLE IMMED 1V            PROCEDURE DATE:  12/05/2021            INTERPRETATION:  Clinical History / Reason for exam: 71-year-old male with Covid pneumonia.    Comparison : Chest radiograph performed 12/3/2021 at 6:32 PM.    Technique/Positioning: AP view..    Findings:    Support devices: None.    Cardiac/mediastinum/hilum: The cardiac silhouette is magnified.    Lung parenchyma/Pleura: There are stable, bilateral interstitial pulmonary opacities.  No pleural effusion or pneumothorax is seen.    Skeleton/soft tissues: There is stable elevation of the right hemidiaphragm.  Osseous structures are stable.    Impression:    Stable, bilateral interstitial pulmonary opacities.        --- End of Report ---              KARLY BILLINGS MD; Attending Radiologist  This document has been electronically signed. Dec  5 2021  6:11PM                                            --------------------------------------------------------------    PHYSICAL EXAM:  GENERAL: in NAD   HEENT: NCAT  LUNGS: air entry bilaterally   HEART: RRR, +S1,S2, RRR  ABDOMEN: SNTTP, ND x 4 q's  EXT: Warm, well perfused x 4  NEURO: AxOx3, No FND's noted  SKIN: No new breakdown or rashes noted

## 2021-12-08 ENCOUNTER — TRANSCRIPTION ENCOUNTER (OUTPATIENT)
Age: 71
End: 2021-12-08

## 2021-12-08 LAB
ALBUMIN SERPL ELPH-MCNC: 3.5 G/DL — SIGNIFICANT CHANGE UP (ref 3.5–5.2)
ALP SERPL-CCNC: 70 U/L — SIGNIFICANT CHANGE UP (ref 30–115)
ALT FLD-CCNC: 53 U/L — HIGH (ref 0–41)
ANION GAP SERPL CALC-SCNC: 15 MMOL/L — HIGH (ref 7–14)
ANISOCYTOSIS BLD QL: SLIGHT — SIGNIFICANT CHANGE UP
AST SERPL-CCNC: 35 U/L — SIGNIFICANT CHANGE UP (ref 0–41)
BASOPHILS # BLD AUTO: 0 K/UL — SIGNIFICANT CHANGE UP (ref 0–0.2)
BASOPHILS NFR BLD AUTO: 0 % — SIGNIFICANT CHANGE UP (ref 0–1)
BILIRUB SERPL-MCNC: 0.6 MG/DL — SIGNIFICANT CHANGE UP (ref 0.2–1.2)
BUN SERPL-MCNC: 12 MG/DL — SIGNIFICANT CHANGE UP (ref 10–20)
BURR CELLS BLD QL SMEAR: SIGNIFICANT CHANGE UP
CALCIUM SERPL-MCNC: 8 MG/DL — LOW (ref 8.5–10.1)
CHLORIDE SERPL-SCNC: 99 MMOL/L — SIGNIFICANT CHANGE UP (ref 98–110)
CO2 SERPL-SCNC: 19 MMOL/L — SIGNIFICANT CHANGE UP (ref 17–32)
CREAT SERPL-MCNC: 0.7 MG/DL — SIGNIFICANT CHANGE UP (ref 0.7–1.5)
CRP SERPL-MCNC: 19 MG/L — HIGH
D DIMER BLD IA.RAPID-MCNC: 350 NG/ML DDU — HIGH (ref 0–230)
EOSINOPHIL # BLD AUTO: 0.1 K/UL — SIGNIFICANT CHANGE UP (ref 0–0.7)
EOSINOPHIL NFR BLD AUTO: 0.9 % — SIGNIFICANT CHANGE UP (ref 0–8)
GIANT PLATELETS BLD QL SMEAR: PRESENT — SIGNIFICANT CHANGE UP
GLUCOSE SERPL-MCNC: 92 MG/DL — SIGNIFICANT CHANGE UP (ref 70–99)
HCT VFR BLD CALC: 38.1 % — LOW (ref 42–52)
HGB BLD-MCNC: 12.7 G/DL — LOW (ref 14–18)
LYMPHOCYTES # BLD AUTO: 0.58 K/UL — LOW (ref 1.2–3.4)
LYMPHOCYTES # BLD AUTO: 5.2 % — LOW (ref 20.5–51.1)
MAGNESIUM SERPL-MCNC: 2 MG/DL — SIGNIFICANT CHANGE UP (ref 1.8–2.4)
MANUAL SMEAR VERIFICATION: SIGNIFICANT CHANGE UP
MCHC RBC-ENTMCNC: 29.5 PG — SIGNIFICANT CHANGE UP (ref 27–31)
MCHC RBC-ENTMCNC: 33.3 G/DL — SIGNIFICANT CHANGE UP (ref 32–37)
MCV RBC AUTO: 88.4 FL — SIGNIFICANT CHANGE UP (ref 80–94)
MONOCYTES # BLD AUTO: 0.39 K/UL — SIGNIFICANT CHANGE UP (ref 0.1–0.6)
MONOCYTES NFR BLD AUTO: 3.5 % — SIGNIFICANT CHANGE UP (ref 1.7–9.3)
NEUTROPHILS # BLD AUTO: 9.81 K/UL — HIGH (ref 1.4–6.5)
NEUTROPHILS NFR BLD AUTO: 87.8 % — HIGH (ref 42.2–75.2)
PLAT MORPH BLD: ABNORMAL
PLATELET # BLD AUTO: 393 K/UL — SIGNIFICANT CHANGE UP (ref 130–400)
POIKILOCYTOSIS BLD QL AUTO: SIGNIFICANT CHANGE UP
POLYCHROMASIA BLD QL SMEAR: SLIGHT — SIGNIFICANT CHANGE UP
POTASSIUM SERPL-MCNC: 4.3 MMOL/L — SIGNIFICANT CHANGE UP (ref 3.5–5)
POTASSIUM SERPL-SCNC: 4.3 MMOL/L — SIGNIFICANT CHANGE UP (ref 3.5–5)
PROT SERPL-MCNC: 5.8 G/DL — LOW (ref 6–8)
RBC # BLD: 4.31 M/UL — LOW (ref 4.7–6.1)
RBC # FLD: 13.4 % — SIGNIFICANT CHANGE UP (ref 11.5–14.5)
RBC BLD AUTO: ABNORMAL
SODIUM SERPL-SCNC: 133 MMOL/L — LOW (ref 135–146)
VARIANT LYMPHS # BLD: 2.6 % — SIGNIFICANT CHANGE UP (ref 0–5)
WBC # BLD: 11.17 K/UL — HIGH (ref 4.8–10.8)
WBC # FLD AUTO: 11.17 K/UL — HIGH (ref 4.8–10.8)

## 2021-12-08 PROCEDURE — 99233 SBSQ HOSP IP/OBS HIGH 50: CPT

## 2021-12-08 RX ADMIN — AMLODIPINE BESYLATE 5 MILLIGRAM(S): 2.5 TABLET ORAL at 06:03

## 2021-12-08 RX ADMIN — ENOXAPARIN SODIUM 40 MILLIGRAM(S): 100 INJECTION SUBCUTANEOUS at 06:03

## 2021-12-08 RX ADMIN — Medication 6 MILLIGRAM(S): at 06:03

## 2021-12-08 RX ADMIN — Medication 5 MILLIGRAM(S): at 21:10

## 2021-12-08 RX ADMIN — ENOXAPARIN SODIUM 40 MILLIGRAM(S): 100 INJECTION SUBCUTANEOUS at 18:16

## 2021-12-08 NOTE — DISCHARGE NOTE NURSING/CASE MANAGEMENT/SOCIAL WORK - PATIENT PORTAL LINK FT
You can access the FollowMyHealth Patient Portal offered by [facility] by registering at the following website: [email or web address]. By joining [facility]’s FollowMyHealth portal, you will also be able to view your health information using other applications (apps) compatible with our system.

## 2021-12-08 NOTE — PROGRESS NOTE ADULT - SUBJECTIVE AND OBJECTIVE BOX
************************************************  Joshua Gaines MD (PGY-1)  Spectra: [phone]  ************************************************    SUBJECTIVE / OVERNIGHT EVENTS  Patient slept well overnight. No acute complaints this AM. Patient does not report fevers, chills, CP, worsening SOB, or n/v/d. Currently saturating on 3L NC and able to ambulate around room some dyspnea.    MEDICATIONS    amLODIPine   Tablet   5 milliGRAM(s) Oral (12-08-21 @ 06:03)    dexAMETHasone     Tablet   6 milliGRAM(s) Oral (12-08-21 @ 06:03)    enoxaparin Injectable   40 milliGRAM(s) SubCutaneous (12-08-21 @ 06:03)   40 milliGRAM(s) SubCutaneous (12-07-21 @ 18:02)    remdesivir  IVPB   500 mL/Hr IV Intermittent (12-07-21 @ 23:15)      VITALS /  EXAM    T(C): 35.9 (12-08-21 @ 12:30), Max: 36.3 (12-08-21 @ 04:40)  HR: 63 (12-08-21 @ 12:30) (56 - 63)  BP: 111/61 (12-08-21 @ 12:30) (111/61 - 162/79)  RR: 18 (12-08-21 @ 12:30) (18 - 20)  SpO2: 84% (12-08-21 @ 12:10) (84% - 96%)    GENERAL: NAD, well-developed  CHEST/LUNG: b/l rhonchi  HEART: Regular rate and rhythm; No murmurs, rubs, or gallops  ABDOMEN: Soft, Nontender, Nondistended; Bowel sounds present, no masses.  EXTREMITIES:  2+ Peripheral Pulses, No clubbing, cyanosis, or edema    LABS             12.7   11.17 )-----------( 393      ( 12-08-21 @ 04:30 )             38.1     133  |  99  |  12  -------------------------<  92   12-08-21 @ 04:30  4.3  |  19  |  0.7    Ca      8.0     12-08-21 @ 04:30  Mg     2.0     12-08-21 @ 04:30    TPro  5.8  /  Alb  3.5  /  TBili  0.6  /  DBili  x   /  AST  35  /  ALT  53  /  AlkPhos  70  /  GGT  x     12-08-21 @ 04:30    D-Dimer Assay, Quantitative: 350 ng/mL DDU (12-08-21 @ 04:30)  C-Reactive Protein, Serum: 65 mg/L (12-05-21 @ 17:04)  Ferritin, Serum: 1034 ng/mL (12-05-21 @ 17:04)  D-Dimer Assay, Quantitative: 178 ng/mL DDU (12-05-21 @ 17:04)

## 2021-12-08 NOTE — PROGRESS NOTE ADULT - SUBJECTIVE AND OBJECTIVE BOX
DEMARCO CAMERON  [facility] T2-3A 017 A ([facility] T2-3A)        Patient was evaluated and examined  by bedside, no dyspnea at rest, still desaturates on 4 L during ambulation to 84%.           REVIEW OF SYSTEMS:  please see pertinent positives mentioned above, all other 12 ROS negative      T(C): , Max: 36.3 (12-08-21 @ 04:40)  HR: 62 (12-08-21 @ 04:40)  BP: 162/79 (12-08-21 @ 04:40)  RR: 20 (12-08-21 @ 12:08)  SpO2: 84% (12-08-21 @ 12:10)  CAPILLARY BLOOD GLUCOSE          PHYSICAL EXAM:  General: NAD, AAOX3, patient is laying comfortably in bed  HEENT: AT, NC, Supple, NO JVD, NO CB  Lungs: improved breath sounds  B/L, no wheezing, no rhonchi  CVS: normal S1, S2, RRR, NO M/G/R  Abdomen: soft, bowel sounds present, non-tender, non-distended  Extremities: no edema, no clubbing, no cyanosis, positive peripheral pulses b/l  Neuro: no acute focal neurological deficits  Skin: no rush, no ecchymosis      LAB  CBC  Date: 12-08-21 @ 04:30  Mean cell Tplnhpsvwd93.5  Mean cell Hemoglobin Conc33.3  Mean cell Volum 88.4  Platelet count-Automate 393  RBC Count 4.31  Red Cell Distrib Width13.4  WBC Count11.17  % Albumin, Urine--  Hematocrit 38.1  Hemoglobin 12.7  CBC  Date: 12-07-21 @ 04:30  Mean cell Gfslelytgc71.7  Mean cell Hemoglobin Conc33.7  Mean cell Volum 88.0  Platelet count-Automate 336  RBC Count 4.18  Red Cell Distrib Width13.5  WBC Count10.10  % Albumin, Urine--  Hematocrit 36.8  Hemoglobin 12.4  CBC  Date: 12-06-21 @ 04:30  Mean cell Eebdditxhb88.8  Mean cell Hemoglobin Conc34.2  Mean cell Volum 87.0  Platelet count-Automate 311  RBC Count 4.23  Red Cell Distrib Width14.0  WBC Count8.14  % Albumin, Urine--  Hematocrit 36.8  Hemoglobin 12.6  CBC  Date: 12-05-21 @ 17:04  Mean cell Yjrwipjkha39.0  Mean cell Hemoglobin Conc34.0  Mean cell Volum 88.2  Platelet count-Automate 274  RBC Count 4.14  Red Cell Distrib Width13.9  WBC Count6.58  % Albumin, Urine--  Hematocrit 36.5  Hemoglobin 12.4  CBC  Date: 12-04-21 @ 04:30  Mean cell Brzjbfnqah10.4  Mean cell Hemoglobin Conc34.0  Mean cell Volum 86.7  Platelet count-Automate 195  RBC Count 4.28  Red Cell Distrib Width13.9  WBC Count3.61  % Albumin, Urine--  Hematocrit 37.1  Hemoglobin 12.6  CBC  Date: 12-03-21 @ 19:00  Mean cell Buoqtnqyni19.5  Mean cell Hemoglobin Conc33.7  Mean cell Volum 87.6  Platelet count-Automate 179  RBC Count 4.27  Red Cell Distrib Width13.9  WBC Count4.54  % Albumin, Urine--  Hematocrit 37.4  Hemoglobin 12.6    [facility]  12-08-21 @ 04:30  Blood Gas Arterial-Calcium,Ionized--  Blood Urea Nitrogen, Serum 12 mg/dL [10 - 20]  Carbon Dioxide, Serum19 mmol/L [17 - 32]  Chloride, Serum99 mmol/L [98 - 110]  Creatinie, Serum0.7 mg/dL [0.7 - 1.5]  Glucose, Serum92 mg/dL [70 - 99]  Potassium, Serum4.3 mmol/L [3.5 - 5.0]  Sodium, Serum 133 mmol/L<L> [135 - 146]  [facility]  12-07-21 @ 04:30  Blood Gas Arterial-Calcium,Ionized--  Blood Urea Nitrogen, Serum 10 mg/dL [10 - 20]  Carbon Dioxide, Serum16 mmol/L<L> [17 - 32]  Chloride, Serum98 mmol/L [98 - 110]  Creatinie, Serum0.7 mg/dL [0.7 - 1.5]  Glucose, Rizjf247 mg/dL<H> [70 - 99]  Potassium, Serum4.3 mmol/L [3.5 - 5.0]  Sodium, Serum 133 mmol/L<L> [135 - 146]  [facility]  12-06-21 @ 04:30  Blood Gas Arterial-Calcium,Ionized--  Blood Urea Nitrogen, Serum 12 mg/dL [10 - 20]  Carbon Dioxide, Serum17 mmol/L [17 - 32]  Chloride, Serum98 mmol/L [98 - 110]  Creatinie, Serum0.7 mg/dL [0.7 - 1.5]  Glucose, Dizrp221 mg/dL<H> [70 - 99]  Potassium, Serum4.1 mmol/L [3.5 - 5.0]  Sodium, Serum 131 mmol/L<L> [135 - 146]                    Medications:  acetaminophen     Tablet .. 650 milliGRAM(s) Oral every 8 hours PRN  amLODIPine   Tablet 5 milliGRAM(s) Oral daily  dexAMETHasone     Tablet 6 milliGRAM(s) Oral daily  enoxaparin Injectable 40 milliGRAM(s) SubCutaneous every 12 hours  guaiFENesin  milliGRAM(s) Oral every 12 hours PRN  influenza  Vaccine (HIGH DOSE) 0.7 milliLiter(s) IntraMuscular once  melatonin 5 milliGRAM(s) Oral at bedtime  sodium chloride 0.65% Nasal 1 Spray(s) Both Nostrils two times a day PRN        Assessment and Plan:   DEMARCO CAMERON  [facility] T2-3A 017 A ([facility] T2-3A)        Patient was evaluated and examined  by bedside, no dyspnea at rest, still desaturates on 4 L during ambulation to 84%.           REVIEW OF SYSTEMS:  please see pertinent positives mentioned above, all other 12 ROS negative      T(C): , Max: 36.3 (12-08-21 @ 04:40)  HR: 62 (12-08-21 @ 04:40)  BP: 162/79 (12-08-21 @ 04:40)  RR: 20 (12-08-21 @ 12:08)  SpO2: 84% (12-08-21 @ 12:10)  CAPILLARY BLOOD GLUCOSE          PHYSICAL EXAM:  General: NAD, AAOX3, patient is laying comfortably in bed  HEENT: AT, NC, Supple, NO JVD, NO CB  Lungs: improved breath sounds  B/L, no wheezing, no rhonchi  CVS: normal S1, S2, RRR, NO M/G/R  Abdomen: soft, bowel sounds present, non-tender, non-distended  Extremities: no edema, no clubbing, no cyanosis, positive peripheral pulses b/l  Neuro: no acute focal neurological deficits  Skin: no rush, no ecchymosis      LAB  CBC  Date: 12-08-21 @ 04:30  Mean cell Ynnkpajsfa51.5  Mean cell Hemoglobin Conc33.3  Mean cell Volum 88.4  Platelet count-Automate 393  RBC Count 4.31  Red Cell Distrib Width13.4  WBC Count11.17  % Albumin, Urine--  Hematocrit 38.1  Hemoglobin 12.7  CBC  Date: 12-07-21 @ 04:30  Mean cell Cydqcuzjii94.7  Mean cell Hemoglobin Conc33.7  Mean cell Volum 88.0  Platelet count-Automate 336  RBC Count 4.18  Red Cell Distrib Width13.5  WBC Count10.10  % Albumin, Urine--  Hematocrit 36.8  Hemoglobin 12.4  CBC  Date: 12-06-21 @ 04:30  Mean cell Vgrgcbocpa96.8  Mean cell Hemoglobin Conc34.2  Mean cell Volum 87.0  Platelet count-Automate 311  RBC Count 4.23  Red Cell Distrib Width14.0  WBC Count8.14  % Albumin, Urine--  Hematocrit 36.8  Hemoglobin 12.6  CBC  Date: 12-05-21 @ 17:04  Mean cell Ytwatmwwul33.0  Mean cell Hemoglobin Conc34.0  Mean cell Volum 88.2  Platelet count-Automate 274  RBC Count 4.14  Red Cell Distrib Width13.9  WBC Count6.58  % Albumin, Urine--  Hematocrit 36.5  Hemoglobin 12.4  CBC  Date: 12-04-21 @ 04:30  Mean cell Pppctcwwmi95.4  Mean cell Hemoglobin Conc34.0  Mean cell Volum 86.7  Platelet count-Automate 195  RBC Count 4.28  Red Cell Distrib Width13.9  WBC Count3.61  % Albumin, Urine--  Hematocrit 37.1  Hemoglobin 12.6  CBC  Date: 12-03-21 @ 19:00  Mean cell Zukclmwwmp79.5  Mean cell Hemoglobin Conc33.7  Mean cell Volum 87.6  Platelet count-Automate 179  RBC Count 4.27  Red Cell Distrib Width13.9  WBC Count4.54  % Albumin, Urine--  Hematocrit 37.4  Hemoglobin 12.6    [facility]  12-08-21 @ 04:30  Blood Gas Arterial-Calcium,Ionized--  Blood Urea Nitrogen, Serum 12 mg/dL [10 - 20]  Carbon Dioxide, Serum19 mmol/L [17 - 32]  Chloride, Serum99 mmol/L [98 - 110]  Creatinie, Serum0.7 mg/dL [0.7 - 1.5]  Glucose, Serum92 mg/dL [70 - 99]  Potassium, Serum4.3 mmol/L [3.5 - 5.0]  Sodium, Serum 133 mmol/L<L> [135 - 146]  [facility]  12-07-21 @ 04:30  Blood Gas Arterial-Calcium,Ionized--  Blood Urea Nitrogen, Serum 10 mg/dL [10 - 20]  Carbon Dioxide, Serum16 mmol/L<L> [17 - 32]  Chloride, Serum98 mmol/L [98 - 110]  Creatinie, Serum0.7 mg/dL [0.7 - 1.5]  Glucose, Vrksf575 mg/dL<H> [70 - 99]  Potassium, Serum4.3 mmol/L [3.5 - 5.0]  Sodium, Serum 133 mmol/L<L> [135 - 146]  [facility]  12-06-21 @ 04:30  Blood Gas Arterial-Calcium,Ionized--  Blood Urea Nitrogen, Serum 12 mg/dL [10 - 20]  Carbon Dioxide, Serum17 mmol/L [17 - 32]  Chloride, Serum98 mmol/L [98 - 110]  Creatinie, Serum0.7 mg/dL [0.7 - 1.5]  Glucose, Ewlgx296 mg/dL<H> [70 - 99]  Potassium, Serum4.1 mmol/L [3.5 - 5.0]  Sodium, Serum 131 mmol/L<L> [135 - 146]          Medications:  acetaminophen     Tablet .. 650 milliGRAM(s) Oral every 8 hours PRN  amLODIPine   Tablet 5 milliGRAM(s) Oral daily  dexAMETHasone     Tablet 6 milliGRAM(s) Oral daily  enoxaparin Injectable 40 milliGRAM(s) SubCutaneous every 12 hours  guaiFENesin  milliGRAM(s) Oral every 12 hours PRN  influenza  Vaccine (HIGH DOSE) 0.7 milliLiter(s) IntraMuscular once  melatonin 5 milliGRAM(s) Oral at bedtime  sodium chloride 0.65% Nasal 1 Spray(s) Both Nostrils two times a day PRN        Assessment and Plan:  71 yr old male with hx of HTN admitted for COVID PNA    # AHRF secondary to  # COVID 19 PNA  - Symptomatic since around 11/30.   - On admission requiring 3L NC  - CXR: b/l infiltrates  - Markers:  D-dimer 340 > 175, CRP 65, Ferritin 1034, Procalc 0.11      - RDV completing today   - IV dexa (since 12/4)    12/4: 3L NC.   12/5: 5-6 L NC. Draw markers @ 4PM. Repeat CXR. c/w RDV, Dexa. Encourage I/S, proning.   12/6: 5L NC. c/w RDV, Dexa. Encourage I/S, proning.   12/7: 5-6L NC today. c/w RDV, Dexa. Desats on movement. repeat walking sats in 48 hours. RDV ends tomorrow.   12/08: 4 L at rest sat 93%, during ambulation on 4 L desat to 84%    # Mild Transaminitis  - likely secondary to viral infection     # Hyponatremia - 133, stable levels        # HTN  - c/w amlodipine 5mg     # DASH diet     # DVT ppx   - start Lovenox    # Ambulate as tolerated    # Full code    #Progress Note Handoff: patient remains hypoxic during ambulation, needs improved oxygenation levels  Family discussion: medical plan of care d/w pt. by bedside Disposition: Home__with home oxygen in 24 to 48 hours.     Total time spent to complete patient's bedside assessment, review medical chart, discuss medical plan of care with covering medical team was more than 35 minutes

## 2021-12-08 NOTE — DISCHARGE NOTE NURSING/CASE MANAGEMENT/SOCIAL WORK - NSDCPEFALRISK_GEN_ALL_CORE
For information on Fall & Injury Prevention, visit: [email or web address] OR  [email or web address] OR  https://www.cdc.gov/steadi/patient.html

## 2021-12-08 NOTE — PROGRESS NOTE ADULT - ASSESSMENT
71M w/ h/o HTN admitted for COVID-19 PNA     #Moderate COVID-19 Pneumonia  #Acute Hypoxemic Respiratory Failure  Initially symptomatic around 11/30. COVID-19 PCR positive 12/3. Admission CXR demonstrated b/l infiltrates consistent with COVID-19 pneumonia.  - c/w 3L NC; wean O2 as tolerated  - On admission requiring 3L NC, now 6L NC   - c/w RDV (2/4-12/8)  - c/w dexamethasone 6mg IV QD (12/4-present)  - repeat ambulatory saturation tomorrow (84% while ambulating on 4L NC today)  - trend inflammatory markers PRN    #HTN  - c/w amlodipine 5mg     DVT PPX: Lvenox 40mg SQ BID  GI PPX: pantoprazole 40mg PO QD  DIET: DASH/TLC  ACTIVITY: IAT  CODE STATUS: Full Code    PENDING: anticipate for d/c tomorrow

## 2021-12-09 LAB
ALBUMIN SERPL ELPH-MCNC: 3.4 G/DL — LOW (ref 3.5–5.2)
ALP SERPL-CCNC: 69 U/L — SIGNIFICANT CHANGE UP (ref 30–115)
ALT FLD-CCNC: 46 U/L — HIGH (ref 0–41)
ANION GAP SERPL CALC-SCNC: 14 MMOL/L — SIGNIFICANT CHANGE UP (ref 7–14)
AST SERPL-CCNC: 29 U/L — SIGNIFICANT CHANGE UP (ref 0–41)
BASOPHILS # BLD AUTO: 0.08 K/UL — SIGNIFICANT CHANGE UP (ref 0–0.2)
BASOPHILS NFR BLD AUTO: 0.7 % — SIGNIFICANT CHANGE UP (ref 0–1)
BILIRUB SERPL-MCNC: 0.6 MG/DL — SIGNIFICANT CHANGE UP (ref 0.2–1.2)
BUN SERPL-MCNC: 17 MG/DL — SIGNIFICANT CHANGE UP (ref 10–20)
CALCIUM SERPL-MCNC: 8.5 MG/DL — SIGNIFICANT CHANGE UP (ref 8.5–10.1)
CHLORIDE SERPL-SCNC: 96 MMOL/L — LOW (ref 98–110)
CO2 SERPL-SCNC: 20 MMOL/L — SIGNIFICANT CHANGE UP (ref 17–32)
CREAT SERPL-MCNC: 0.7 MG/DL — SIGNIFICANT CHANGE UP (ref 0.7–1.5)
EOSINOPHIL # BLD AUTO: 0.02 K/UL — SIGNIFICANT CHANGE UP (ref 0–0.7)
EOSINOPHIL NFR BLD AUTO: 0.2 % — SIGNIFICANT CHANGE UP (ref 0–8)
FERRITIN SERPL-MCNC: 691 NG/ML — HIGH (ref 30–400)
GLUCOSE SERPL-MCNC: 98 MG/DL — SIGNIFICANT CHANGE UP (ref 70–99)
HCT VFR BLD CALC: 37.8 % — LOW (ref 42–52)
HGB BLD-MCNC: 12.8 G/DL — LOW (ref 14–18)
IMM GRANULOCYTES NFR BLD AUTO: 4.8 % — HIGH (ref 0.1–0.3)
LYMPHOCYTES # BLD AUTO: 1 K/UL — LOW (ref 1.2–3.4)
LYMPHOCYTES # BLD AUTO: 8.3 % — LOW (ref 20.5–51.1)
MAGNESIUM SERPL-MCNC: 2.2 MG/DL — SIGNIFICANT CHANGE UP (ref 1.8–2.4)
MCHC RBC-ENTMCNC: 29.6 PG — SIGNIFICANT CHANGE UP (ref 27–31)
MCHC RBC-ENTMCNC: 33.9 G/DL — SIGNIFICANT CHANGE UP (ref 32–37)
MCV RBC AUTO: 87.5 FL — SIGNIFICANT CHANGE UP (ref 80–94)
MONOCYTES # BLD AUTO: 0.82 K/UL — HIGH (ref 0.1–0.6)
MONOCYTES NFR BLD AUTO: 6.8 % — SIGNIFICANT CHANGE UP (ref 1.7–9.3)
NEUTROPHILS # BLD AUTO: 9.62 K/UL — HIGH (ref 1.4–6.5)
NEUTROPHILS NFR BLD AUTO: 79.2 % — HIGH (ref 42.2–75.2)
NRBC # BLD: 0 /100 WBCS — SIGNIFICANT CHANGE UP (ref 0–0)
PLATELET # BLD AUTO: 395 K/UL — SIGNIFICANT CHANGE UP (ref 130–400)
POTASSIUM SERPL-MCNC: 5 MMOL/L — SIGNIFICANT CHANGE UP (ref 3.5–5)
POTASSIUM SERPL-SCNC: 5 MMOL/L — SIGNIFICANT CHANGE UP (ref 3.5–5)
PROT SERPL-MCNC: 6 G/DL — SIGNIFICANT CHANGE UP (ref 6–8)
RBC # BLD: 4.32 M/UL — LOW (ref 4.7–6.1)
RBC # FLD: 13.7 % — SIGNIFICANT CHANGE UP (ref 11.5–14.5)
SODIUM SERPL-SCNC: 130 MMOL/L — LOW (ref 135–146)
WBC # BLD: 12.12 K/UL — HIGH (ref 4.8–10.8)
WBC # FLD AUTO: 12.12 K/UL — HIGH (ref 4.8–10.8)

## 2021-12-09 PROCEDURE — 99233 SBSQ HOSP IP/OBS HIGH 50: CPT

## 2021-12-09 RX ADMIN — ENOXAPARIN SODIUM 40 MILLIGRAM(S): 100 INJECTION SUBCUTANEOUS at 17:52

## 2021-12-09 RX ADMIN — Medication 6 MILLIGRAM(S): at 05:31

## 2021-12-09 RX ADMIN — ENOXAPARIN SODIUM 40 MILLIGRAM(S): 100 INJECTION SUBCUTANEOUS at 05:31

## 2021-12-09 RX ADMIN — Medication 5 MILLIGRAM(S): at 21:01

## 2021-12-09 RX ADMIN — AMLODIPINE BESYLATE 5 MILLIGRAM(S): 2.5 TABLET ORAL at 05:31

## 2021-12-09 NOTE — PROGRESS NOTE ADULT - SUBJECTIVE AND OBJECTIVE BOX
************************************************  Joshua Gaines MD (PGY-1)  Spectra: [phone]  ************************************************    SUBJECTIVE / OVERNIGHT EVENTS  Patient slept well overnight. No acute complaints this AM. Patient does not report fevers, chills, CP, worsening SOB, or n/v/d. Currently requires 4L NC at rest, but desaturates to mid-80's% while ambulating.    MEDICATIONS    amLODIPine   Tablet   5 milliGRAM(s) Oral (12-09-21 @ 05:31)    dexAMETHasone     Tablet   6 milliGRAM(s) Oral (12-09-21 @ 05:31)    enoxaparin Injectable   40 milliGRAM(s) SubCutaneous (12-09-21 @ 05:31)   40 milliGRAM(s) SubCutaneous (12-08-21 @ 18:16)    melatonin   5 milliGRAM(s) Oral (12-08-21 @ 21:10)    VITALS /  EXAM    T(C): 36.4 (12-09-21 @ 12:30), Max: 36.9 (12-08-21 @ 20:46)  HR: 71 (12-09-21 @ 12:30) (52 - 71)  BP: 100/59 (12-09-21 @ 12:30) (100/59 - 124/59)  RR: 18 (12-09-21 @ 12:30) (18 - 18)  SpO2: 87% (12-09-21 @ 09:18) (86% - 96%)    GENERAL: NAD, well-developed  CHEST/LUNG: b/l rhonchi  HEART: Regular rate and rhythm; No murmurs, rubs, or gallops  ABDOMEN: Soft, Nontender, Nondistended; Bowel sounds present, no masses.  EXTREMITIES:  2+ Peripheral Pulses, No clubbing, cyanosis, or edema    LABS             12.8   12.12 )-----------( 395      ( 12-09-21 @ 07:05 )             37.8     130  |  96  |  17  -------------------------<  98   12-09-21 @ 07:05  5.0  |  20  |  0.7    Ca      8.5     12-09-21 @ 07:05  Mg     2.2     12-09-21 @ 07:05    TPro  6.0  /  Alb  3.4  /  TBili  0.6  /  DBili  x   /  AST  29  /  ALT  46  /  AlkPhos  69  /  GGT  x     12-09-21 @ 07:05    Ferritin, Serum: 691 ng/mL (12-08-21 @ 10:51)  D-Dimer Assay, Quantitative: 350 ng/mL DDU (12-08-21 @ 04:30)  C-Reactive Protein, Serum: 19 mg/L (12-08-21 @ 04:30)  C-Reactive Protein, Serum: 65 mg/L (12-05-21 @ 17:04)  Ferritin, Serum: 1034 ng/mL (12-05-21 @ 17:04)  D-Dimer Assay, Quantitative: 178 ng/mL DDU (12-05-21 @ 17:04)

## 2021-12-09 NOTE — PROGRESS NOTE ADULT - ASSESSMENT
71M w/ h/o HTN admitted for COVID-19 PNA     #Severe COVID-19 Pneumonia  #Acute Hypoxemic Respiratory Failure  Initially symptomatic around 11/30. COVID-19 PCR positive 12/3. Admission CXR demonstrated b/l infiltrates consistent with COVID-19 pneumonia. s/p RDV 12/4-12/8.  - c/w 3-4L NC; wean O2 as tolerated  - c/w dexamethasone 6mg IV QD (12/4-present)  - repeat ambulatory saturation tomorrow (86% while ambulating on 3L NC today)  - trend inflammatory markers PRN    #HTN  - c/w amlodipine 5mg     DVT PPX: Lvenox 40mg SQ BID  GI PPX: pantoprazole 40mg PO QD  DIET: DASH/TLC  ACTIVITY: IAT  CODE STATUS: Full Code    PENDING: anticipate for d/c tomorrow 71M w/ h/o HTN admitted for COVID-19 PNA     #Severe COVID-19 Pneumonia  #Acute Hypoxemic Respiratory Failure  Initially symptomatic around 11/30. COVID-19 PCR positive 12/3. Admission CXR demonstrated b/l infiltrates consistent with COVID-19 pneumonia. s/p RDV 12/4-12/8.  - c/w 3-4L NC; wean O2 as tolerated  - c/w dexamethasone 6mg IV QD (12/4-present)  - repeat ambulatory saturation tomorrow (86% while ambulating on 3L NC today)  - trend inflammatory markers PRN    #HTN  - c/w amlodipine 5mg     DVT PPX: Lvenox 40mg SQ BID  GI PPX: pantoprazole 40mg PO QD  DIET: DASH/TLC  ACTIVITY: IAT  CODE STATUS: Full Code    PENDING: repeat ambulatory O2 sat tomorrow

## 2021-12-09 NOTE — PROGRESS NOTE ADULT - SUBJECTIVE AND OBJECTIVE BOX
DEMARCO CAMERON  [facility] T2-3A 017 A ([facility] T2-3A)        Patient was evaluated and examined  by bedside, no active complains       REVIEW OF SYSTEMS:  please see pertinent positives mentioned above, all other 12 ROS negative      T(C): , Max: 36.9 (12-08-21 @ 20:46)  HR: 52 (12-09-21 @ 05:35)  BP: 124/59 (12-09-21 @ 05:35)  RR: 18 (12-09-21 @ 09:18)  SpO2: 87% (12-09-21 @ 09:18)  CAPILLARY BLOOD GLUCOSE          PHYSICAL EXAM:  General: NAD, AAOX3, patient is laying comfortably in bed  HEENT: AT, NC, Supple, NO JVD, NO CB  Lungs: mild decreased breath sounds  B/L, no wheezing, no rhonchi  CVS: normal S1, S2, RRR, NO M/G/R  Abdomen: soft, bowel sounds present, non-tender, non-distended  Extremities: no edema, no clubbing, no cyanosis, positive peripheral pulses b/l  Neuro: no acute focal neurological deficits  Skin: no rash, no ecchymosis      LAB  CBC  Date: 12-09-21 @ 07:05  Mean cell Ylktbocknf23.6  Mean cell Hemoglobin Conc33.9  Mean cell Volum 87.5  Platelet count-Automate 395  RBC Count 4.32  Red Cell Distrib Width13.7  WBC Count12.12  % Albumin, Urine--  Hematocrit 37.8  Hemoglobin 12.8  CBC  Date: 12-08-21 @ 04:30  Mean cell Zibiybiiyq19.5  Mean cell Hemoglobin Conc33.3  Mean cell Volum 88.4  Platelet count-Automate 393  RBC Count 4.31  Red Cell Distrib Width13.4  WBC Count11.17  % Albumin, Urine--  Hematocrit 38.1  Hemoglobin 12.7  CBC  Date: 12-07-21 @ 04:30  Mean cell Dnmlkjdlbp31.7  Mean cell Hemoglobin Conc33.7  Mean cell Volum 88.0  Platelet count-Automate 336  RBC Count 4.18  Red Cell Distrib Width13.5  WBC Count10.10  % Albumin, Urine--  Hematocrit 36.8  Hemoglobin 12.4  CBC  Date: 12-06-21 @ 04:30  Mean cell Snluaqglyw00.8  Mean cell Hemoglobin Conc34.2  Mean cell Volum 87.0  Platelet count-Automate 311  RBC Count 4.23  Red Cell Distrib Width14.0  WBC Count8.14  % Albumin, Urine--  Hematocrit 36.8  Hemoglobin 12.6  CBC  Date: 12-05-21 @ 17:04  Mean cell Ezdepbxduu26.0  Mean cell Hemoglobin Conc34.0  Mean cell Volum 88.2  Platelet count-Automate 274  RBC Count 4.14  Red Cell Distrib Width13.9  WBC Count6.58  % Albumin, Urine--  Hematocrit 36.5  Hemoglobin 12.4  CBC  Date: 12-04-21 @ 04:30  Mean cell Qxxgnajkgl08.4  Mean cell Hemoglobin Conc34.0  Mean cell Volum 86.7  Platelet count-Automate 195  RBC Count 4.28  Red Cell Distrib Width13.9  WBC Count3.61  % Albumin, Urine--  Hematocrit 37.1  Hemoglobin 12.6  CBC  Date: 12-03-21 @ 19:00  Mean cell Gyijyqhcpx78.5  Mean cell Hemoglobin Conc33.7  Mean cell Volum 87.6  Platelet count-Automate 179  RBC Count 4.27  Red Cell Distrib Width13.9  WBC Count4.54  % Albumin, Urine--  Hematocrit 37.4  Hemoglobin 12.6    [facility]  12-09-21 @ 07:05  Blood Gas Arterial-Calcium,Ionized--  Blood Urea Nitrogen, Serum 17 mg/dL [10 - 20]  Carbon Dioxide, Serum20 mmol/L [17 - 32]  Chloride, Serum96 mmol/L<L> [98 - 110]  Creatinie, Serum0.7 mg/dL [0.7 - 1.5]  Glucose, Serum98 mg/dL [70 - 99]  Potassium, Serum5.0 mmol/L [3.5 - 5.0]  Sodium, Serum 130 mmol/L<L> [135 - 146]  [facility]  12-08-21 @ 04:30  Blood Gas Arterial-Calcium,Ionized--  Blood Urea Nitrogen, Serum 12 mg/dL [10 - 20]  Carbon Dioxide, Serum19 mmol/L [17 - 32]  Chloride, Serum99 mmol/L [98 - 110]  Creatinie, Serum0.7 mg/dL [0.7 - 1.5]  Glucose, Serum92 mg/dL [70 - 99]  Potassium, Serum4.3 mmol/L [3.5 - 5.0]  Sodium, Serum 133 mmol/L<L> [135 - 146]  [facility]  12-07-21 @ 04:30  Blood Gas Arterial-Calcium,Ionized--  Blood Urea Nitrogen, Serum 10 mg/dL [10 - 20]  Carbon Dioxide, Serum16 mmol/L<L> [17 - 32]  Chloride, Serum98 mmol/L [98 - 110]  Creatinie, Serum0.7 mg/dL [0.7 - 1.5]  Glucose, Ywhbu028 mg/dL<H> [70 - 99]  Potassium, Serum4.3 mmol/L [3.5 - 5.0]  Sodium, Serum 133 mmol/L<L> [135 - 146]  [facility]  12-06-21 @ 04:30  Blood Gas Arterial-Calcium,Ionized--  Blood Urea Nitrogen, Serum 12 mg/dL [10 - 20]  Carbon Dioxide, Serum17 mmol/L [17 - 32]  Chloride, Serum98 mmol/L [98 - 110]  Creatinie, Serum0.7 mg/dL [0.7 - 1.5]  Glucose, Qkxvp383 mg/dL<H> [70 - 99]  Potassium, Serum4.1 mmol/L [3.5 - 5.0]  Sodium, Serum 131 mmol/L<L> [135 - 146]  [facility]  12-05-21 @ 17:04  Blood Gas Arterial-Calcium,Ionized--  Blood Urea Nitrogen, Serum 12 mg/dL [10 - 20]  Carbon Dioxide, Serum14 mmol/L<L> [17 - 32]  Chloride, Nsnmj982 mmol/L [98 - 110]  Creatinie, Serum0.7 mg/dL [0.7 - 1.5]  Glucose, Ujwep147 mg/dL<H> [70 - 99]  Potassium, Serum4.2 mmol/L [3.5 - 5.0]  Sodium, Serum 132 mmol/L<L> [135 - 146]        Medications:  acetaminophen     Tablet .. 650 milliGRAM(s) Oral every 8 hours PRN  amLODIPine   Tablet 5 milliGRAM(s) Oral daily  dexAMETHasone     Tablet 6 milliGRAM(s) Oral daily  enoxaparin Injectable 40 milliGRAM(s) SubCutaneous every 12 hours  guaiFENesin  milliGRAM(s) Oral every 12 hours PRN  influenza  Vaccine (HIGH DOSE) 0.7 milliLiter(s) IntraMuscular once  melatonin 5 milliGRAM(s) Oral at bedtime  sodium chloride 0.65% Nasal 1 Spray(s) Both Nostrils two times a day PRN        Assessment and Plan:  71 yr old male with hx of HTN admitted for COVID PNA    # AHRF secondary to  # COVID 19 PNA  - Symptomatic since around 11/30.   - CXR: b/l infiltrates  - Markers:  D-dimer 340 > 175, CRP 65, Ferritin 1034, Procalc 0.11      - RDV completing today   - IV dexa (since 12/4)    12/4: 3L NC.   12/5: 5-6 L NC. Draw markers @ 4PM. Repeat CXR. c/w RDV, Dexa. Encourage I/S, proning.   12/6: 5L NC. c/w RDV, Dexa. Encourage I/S, proning.   12/7: 5-6L NC today. c/w RDV, Dexa. Desats on movement. repeat walking sats in 48 hours. RDV ends tomorrow.   12/08: 4 L at rest sat 93%, during ambulation on 4 L desat to 84%  12/09: 4 L at rest sat 93%, during ambulation on 3 L desat to 86%    # Mild Transaminitis  - likely secondary to viral infection     # Hyponatremia - 133, stable levels        # HTN  - c/w amlodipine 5mg     # DASH diet     # DVT ppx   - start Lovenox    # Ambulate as tolerated    # Full code    #Progress Note Handoff: patient remains hypoxic during ambulation, needs improved oxygenation levels  Family discussion: medical plan of care d/w pt. by bedside Disposition: Home__with home oxygen in 24 to 48 hours.     Total time spent to complete patient's bedside assessment, review medical chart, discuss medical plan of care with covering medical team was more than 35 minutes

## 2021-12-10 LAB
ALBUMIN SERPL ELPH-MCNC: 3.4 G/DL — LOW (ref 3.5–5.2)
ALP SERPL-CCNC: 69 U/L — SIGNIFICANT CHANGE UP (ref 30–115)
ALT FLD-CCNC: 46 U/L — HIGH (ref 0–41)
ANION GAP SERPL CALC-SCNC: 14 MMOL/L — SIGNIFICANT CHANGE UP (ref 7–14)
AST SERPL-CCNC: 27 U/L — SIGNIFICANT CHANGE UP (ref 0–41)
BASOPHILS # BLD AUTO: 0.08 K/UL — SIGNIFICANT CHANGE UP (ref 0–0.2)
BASOPHILS NFR BLD AUTO: 0.6 % — SIGNIFICANT CHANGE UP (ref 0–1)
BILIRUB SERPL-MCNC: 0.7 MG/DL — SIGNIFICANT CHANGE UP (ref 0.2–1.2)
BUN SERPL-MCNC: 16 MG/DL — SIGNIFICANT CHANGE UP (ref 10–20)
CALCIUM SERPL-MCNC: 8.3 MG/DL — LOW (ref 8.5–10.1)
CHLORIDE SERPL-SCNC: 100 MMOL/L — SIGNIFICANT CHANGE UP (ref 98–110)
CO2 SERPL-SCNC: 21 MMOL/L — SIGNIFICANT CHANGE UP (ref 17–32)
CREAT SERPL-MCNC: 0.7 MG/DL — SIGNIFICANT CHANGE UP (ref 0.7–1.5)
D DIMER BLD IA.RAPID-MCNC: 186 NG/ML DDU — SIGNIFICANT CHANGE UP (ref 0–230)
EOSINOPHIL # BLD AUTO: 0.03 K/UL — SIGNIFICANT CHANGE UP (ref 0–0.7)
EOSINOPHIL NFR BLD AUTO: 0.2 % — SIGNIFICANT CHANGE UP (ref 0–8)
GLUCOSE SERPL-MCNC: 93 MG/DL — SIGNIFICANT CHANGE UP (ref 70–99)
HCT VFR BLD CALC: 36.6 % — LOW (ref 42–52)
HGB BLD-MCNC: 12.4 G/DL — LOW (ref 14–18)
IMM GRANULOCYTES NFR BLD AUTO: 6.3 % — HIGH (ref 0.1–0.3)
LYMPHOCYTES # BLD AUTO: 1.06 K/UL — LOW (ref 1.2–3.4)
LYMPHOCYTES # BLD AUTO: 7.7 % — LOW (ref 20.5–51.1)
MAGNESIUM SERPL-MCNC: 2.3 MG/DL — SIGNIFICANT CHANGE UP (ref 1.8–2.4)
MCHC RBC-ENTMCNC: 29.8 PG — SIGNIFICANT CHANGE UP (ref 27–31)
MCHC RBC-ENTMCNC: 33.9 G/DL — SIGNIFICANT CHANGE UP (ref 32–37)
MCV RBC AUTO: 88 FL — SIGNIFICANT CHANGE UP (ref 80–94)
MONOCYTES # BLD AUTO: 1 K/UL — HIGH (ref 0.1–0.6)
MONOCYTES NFR BLD AUTO: 7.2 % — SIGNIFICANT CHANGE UP (ref 1.7–9.3)
NEUTROPHILS # BLD AUTO: 10.77 K/UL — HIGH (ref 1.4–6.5)
NEUTROPHILS NFR BLD AUTO: 78 % — HIGH (ref 42.2–75.2)
NRBC # BLD: 0 /100 WBCS — SIGNIFICANT CHANGE UP (ref 0–0)
PHOSPHATE SERPL-MCNC: 4.1 MG/DL — SIGNIFICANT CHANGE UP (ref 2.1–4.9)
PLATELET # BLD AUTO: 416 K/UL — HIGH (ref 130–400)
POTASSIUM SERPL-MCNC: 5.1 MMOL/L — HIGH (ref 3.5–5)
POTASSIUM SERPL-SCNC: 5.1 MMOL/L — HIGH (ref 3.5–5)
PROT SERPL-MCNC: 5.7 G/DL — LOW (ref 6–8)
RBC # BLD: 4.16 M/UL — LOW (ref 4.7–6.1)
RBC # FLD: 13.9 % — SIGNIFICANT CHANGE UP (ref 11.5–14.5)
SODIUM SERPL-SCNC: 135 MMOL/L — SIGNIFICANT CHANGE UP (ref 135–146)
WBC # BLD: 13.81 K/UL — HIGH (ref 4.8–10.8)
WBC # FLD AUTO: 13.81 K/UL — HIGH (ref 4.8–10.8)

## 2021-12-10 PROCEDURE — 99233 SBSQ HOSP IP/OBS HIGH 50: CPT

## 2021-12-10 RX ORDER — SENNA PLUS 8.6 MG/1
2 TABLET ORAL AT BEDTIME
Refills: 0 | Status: ACTIVE | OUTPATIENT
Start: 2021-12-10 | End: 2022-11-08

## 2021-12-10 RX ORDER — POLYETHYLENE GLYCOL 3350 17 G/17G
17 POWDER, FOR SOLUTION ORAL DAILY
Refills: 0 | Status: ACTIVE | OUTPATIENT
Start: 2021-12-10 | End: 2022-11-08

## 2021-12-10 RX ADMIN — Medication 5 MILLIGRAM(S): at 22:03

## 2021-12-10 RX ADMIN — AMLODIPINE BESYLATE 5 MILLIGRAM(S): 2.5 TABLET ORAL at 05:14

## 2021-12-10 RX ADMIN — SENNA PLUS 2 TABLET(S): 8.6 TABLET ORAL at 23:33

## 2021-12-10 RX ADMIN — ENOXAPARIN SODIUM 40 MILLIGRAM(S): 100 INJECTION SUBCUTANEOUS at 17:34

## 2021-12-10 RX ADMIN — Medication 6 MILLIGRAM(S): at 05:14

## 2021-12-10 RX ADMIN — ENOXAPARIN SODIUM 40 MILLIGRAM(S): 100 INJECTION SUBCUTANEOUS at 05:15

## 2021-12-10 NOTE — PROGRESS NOTE ADULT - ASSESSMENT
71M w/ h/o HTN admitted for COVID-19 PNA     #Severe COVID-19 Pneumonia  #Acute Hypoxemic Respiratory Failure  Initially symptomatic around 11/30. COVID-19 PCR positive 12/3. Admission CXR demonstrated b/l infiltrates consistent with COVID-19 pneumonia. s/p RDV 12/4-12/8.  - c/w 3-4L NC; wean O2 as tolerated  - c/w dexamethasone 6mg IV QD (12/4-present)  - repeat ambulatory saturation today (86% while ambulating on 3L NC yesterday)  - trend inflammatory markers PRN    #HTN  - c/w amlodipine 5mg     DVT PPX: Lovenox 40mg SQ BID  GI PPX: pantoprazole 40mg PO QD  DIET: DASH/TLC  ACTIVITY: IAT  CODE STATUS: Full Code    PENDING: repeat ambulatory O2 saturation

## 2021-12-11 LAB
ALBUMIN SERPL ELPH-MCNC: 3.3 G/DL — LOW (ref 3.5–5.2)
ALP SERPL-CCNC: 65 U/L — SIGNIFICANT CHANGE UP (ref 30–115)
ALT FLD-CCNC: 45 U/L — HIGH (ref 0–41)
ANION GAP SERPL CALC-SCNC: 15 MMOL/L — HIGH (ref 7–14)
AST SERPL-CCNC: 23 U/L — SIGNIFICANT CHANGE UP (ref 0–41)
BASOPHILS # BLD AUTO: 0.05 K/UL — SIGNIFICANT CHANGE UP (ref 0–0.2)
BASOPHILS NFR BLD AUTO: 0.4 % — SIGNIFICANT CHANGE UP (ref 0–1)
BILIRUB SERPL-MCNC: 0.6 MG/DL — SIGNIFICANT CHANGE UP (ref 0.2–1.2)
BUN SERPL-MCNC: 18 MG/DL — SIGNIFICANT CHANGE UP (ref 10–20)
CALCIUM SERPL-MCNC: 8.4 MG/DL — LOW (ref 8.5–10.1)
CHLORIDE SERPL-SCNC: 95 MMOL/L — LOW (ref 98–110)
CO2 SERPL-SCNC: 18 MMOL/L — SIGNIFICANT CHANGE UP (ref 17–32)
CREAT SERPL-MCNC: 0.7 MG/DL — SIGNIFICANT CHANGE UP (ref 0.7–1.5)
CRP SERPL-MCNC: 14 MG/L — HIGH
EOSINOPHIL # BLD AUTO: 0 K/UL — SIGNIFICANT CHANGE UP (ref 0–0.7)
EOSINOPHIL NFR BLD AUTO: 0 % — SIGNIFICANT CHANGE UP (ref 0–8)
FERRITIN SERPL-MCNC: 631 NG/ML — HIGH (ref 30–400)
GLUCOSE BLDC GLUCOMTR-MCNC: 175 MG/DL — HIGH (ref 70–99)
GLUCOSE BLDC GLUCOMTR-MCNC: 229 MG/DL — HIGH (ref 70–99)
GLUCOSE SERPL-MCNC: 231 MG/DL — HIGH (ref 70–99)
HCT VFR BLD CALC: 36.2 % — LOW (ref 42–52)
HGB BLD-MCNC: 12 G/DL — LOW (ref 14–18)
IMM GRANULOCYTES NFR BLD AUTO: 6.2 % — HIGH (ref 0.1–0.3)
LYMPHOCYTES # BLD AUTO: 0.44 K/UL — LOW (ref 1.2–3.4)
LYMPHOCYTES # BLD AUTO: 3.5 % — LOW (ref 20.5–51.1)
MAGNESIUM SERPL-MCNC: 2.2 MG/DL — SIGNIFICANT CHANGE UP (ref 1.8–2.4)
MCHC RBC-ENTMCNC: 29.4 PG — SIGNIFICANT CHANGE UP (ref 27–31)
MCHC RBC-ENTMCNC: 33.1 G/DL — SIGNIFICANT CHANGE UP (ref 32–37)
MCV RBC AUTO: 88.7 FL — SIGNIFICANT CHANGE UP (ref 80–94)
MONOCYTES # BLD AUTO: 0.6 K/UL — SIGNIFICANT CHANGE UP (ref 0.1–0.6)
MONOCYTES NFR BLD AUTO: 4.8 % — SIGNIFICANT CHANGE UP (ref 1.7–9.3)
NEUTROPHILS # BLD AUTO: 10.62 K/UL — HIGH (ref 1.4–6.5)
NEUTROPHILS NFR BLD AUTO: 85.1 % — HIGH (ref 42.2–75.2)
NRBC # BLD: 0 /100 WBCS — SIGNIFICANT CHANGE UP (ref 0–0)
PLATELET # BLD AUTO: 408 K/UL — HIGH (ref 130–400)
POTASSIUM SERPL-MCNC: 4.8 MMOL/L — SIGNIFICANT CHANGE UP (ref 3.5–5)
POTASSIUM SERPL-SCNC: 4.8 MMOL/L — SIGNIFICANT CHANGE UP (ref 3.5–5)
PROCALCITONIN SERPL-MCNC: 0.05 NG/ML — SIGNIFICANT CHANGE UP (ref 0.02–0.1)
PROT SERPL-MCNC: 5.8 G/DL — LOW (ref 6–8)
RBC # BLD: 4.08 M/UL — LOW (ref 4.7–6.1)
RBC # FLD: 14.2 % — SIGNIFICANT CHANGE UP (ref 11.5–14.5)
SODIUM SERPL-SCNC: 128 MMOL/L — LOW (ref 135–146)
WBC # BLD: 12.48 K/UL — HIGH (ref 4.8–10.8)
WBC # FLD AUTO: 12.48 K/UL — HIGH (ref 4.8–10.8)

## 2021-12-11 PROCEDURE — 99233 SBSQ HOSP IP/OBS HIGH 50: CPT

## 2021-12-11 RX ADMIN — ENOXAPARIN SODIUM 40 MILLIGRAM(S): 100 INJECTION SUBCUTANEOUS at 17:07

## 2021-12-11 RX ADMIN — SENNA PLUS 2 TABLET(S): 8.6 TABLET ORAL at 21:25

## 2021-12-11 RX ADMIN — Medication 5 MILLIGRAM(S): at 21:25

## 2021-12-11 RX ADMIN — POLYETHYLENE GLYCOL 3350 17 GRAM(S): 17 POWDER, FOR SOLUTION ORAL at 11:03

## 2021-12-11 RX ADMIN — AMLODIPINE BESYLATE 5 MILLIGRAM(S): 2.5 TABLET ORAL at 06:06

## 2021-12-11 RX ADMIN — ENOXAPARIN SODIUM 40 MILLIGRAM(S): 100 INJECTION SUBCUTANEOUS at 06:06

## 2021-12-11 RX ADMIN — Medication 6 MILLIGRAM(S): at 06:06

## 2021-12-11 NOTE — PROGRESS NOTE ADULT - SUBJECTIVE AND OBJECTIVE BOX
************************************************  Joshua Gaines MD (PGY-1)  Spectra: [phone]  ************************************************    SUBJECTIVE / OVERNIGHT EVENTS  Patient slept well overnight. No acute complaints this AM. Patient does not report fevers, chills, CP, worsening SOB, or n/v/d. Currently requires 4L NC.    MEDICATIONS    amLODIPine   Tablet   5 milliGRAM(s) Oral (12-11-21 @ 06:06)    dexAMETHasone     Tablet   6 milliGRAM(s) Oral (12-11-21 @ 06:06)    enoxaparin Injectable   40 milliGRAM(s) SubCutaneous (12-11-21 @ 06:06)   40 milliGRAM(s) SubCutaneous (12-10-21 @ 17:34)    melatonin   5 milliGRAM(s) Oral (12-10-21 @ 22:03)    polyethylene glycol 3350   17 Gram(s) Oral (12-11-21 @ 11:03)    senna   2 Tablet(s) Oral (12-10-21 @ 23:33)      VITALS /  EXAM    T(C): 36.2 (12-11-21 @ 06:00), Max: 36.9 (12-10-21 @ 20:55)  HR: 52 (12-11-21 @ 06:00) (52 - 69)  BP: 97/56 (12-11-21 @ 06:00) (97/56 - 113/60)  RR: 18 (12-11-21 @ 06:00) (18 - 18)  SpO2: 92% (12-11-21 @ 08:30) (92% - 94%)    GENERAL: NAD, well-developed  CHEST/LUNG: b/l rhonchi  HEART: Regular rate and rhythm; No murmurs, rubs, or gallops  ABDOMEN: Soft, Nontender, Nondistended; Bowel sounds present, no masses.  EXTREMITIES:  2+ Peripheral Pulses, No clubbing, cyanosis, or edema     LABS             12.4   13.81 )-----------( 416      ( 12-10-21 @ 07:04 )             36.6     135  |  100  |  16  -------------------------<  93   12-10-21 @ 07:04  5.1  |  21  |  0.7    Ca      8.3     12-10-21 @ 07:04  Phos   4.1     12-10-21 @ 07:04  Mg     2.3     12-10-21 @ 07:04    TPro  5.7  /  Alb  3.4  /  TBili  0.7  /  DBili  x   /  AST  27  /  ALT  46  /  AlkPhos  69  /  GGT  x     12-10-21 @ 07:04

## 2021-12-11 NOTE — PROGRESS NOTE ADULT - ASSESSMENT
71M w/ h/o HTN admitted for COVID-19 PNA     #Severe COVID-19 Pneumonia  #Acute Hypoxemic Respiratory Failure  Initially symptomatic around 11/30. COVID-19 PCR positive 12/3. Admission CXR demonstrated b/l infiltrates consistent with COVID-19 pneumonia. s/p RDV 12/4-12/8. Medically stable for discharge at this time. Pending home oxygen delivery on Monday.  - c/w 4L NC; wean O2 as tolerated  - c/w dexamethasone 6mg IV QD (12/4-present)  - home oxygen delivery on Monday  - trend inflammatory markers PRN    #HTN  - c/w amlodipine 5mg     DVT PPX: Lovenox 40mg SQ BID  GI PPX: pantoprazole 40mg PO QD  DIET: DASH/TLC  ACTIVITY: IAT  CODE STATUS: Full Code    PENDING: home oxygen delivery on Monday

## 2021-12-11 NOTE — PROGRESS NOTE ADULT - SUBJECTIVE AND OBJECTIVE BOX
DEMARCO CAMERON  [facility] T2-3A 017 A ([facility] T2-3A)      Patient was evaluated and examined  by bedside, no active complains      REVIEW OF SYSTEMS:  please see pertinent positives mentioned above, all other 12 ROS negative      T(C): , Max: 36.9 (12-10-21 @ 20:55)  HR: 52 (12-11-21 @ 06:00)  BP: 97/56 (12-11-21 @ 06:00)  RR: 18 (12-11-21 @ 06:00)  SpO2: 92% (12-11-21 @ 08:30)  CAPILLARY BLOOD GLUCOSE          PHYSICAL EXAM:  General: NAD, AAOX3, patient is laying comfortably in bed  HEENT: AT, NC, Supple, NO JVD, NO CB  Lungs: mild decreased breath sounds B/L, no wheezing, no rhonchi  CVS: normal S1, S2, RRR, NO M/G/R  Abdomen: soft, bowel sounds present, non-tender, non-distended  Extremities: no edema, no clubbing, no cyanosis, positive peripheral pulses b/l  Neuro: no acute focal neurological deficits  Skin: no rash, no ecchymosis      LAB  CBC  Date: 12-10-21 @ 07:04  Mean cell Nxvkpwmlkf19.8  Mean cell Hemoglobin Conc33.9  Mean cell Volum 88.0  Platelet count-Automate 416  RBC Count 4.16  Red Cell Distrib Width13.9  WBC Count13.81  % Albumin, Urine--  Hematocrit 36.6  Hemoglobin 12.4  CBC  Date: 12-09-21 @ 07:05  Mean cell Hxyqkddqsy44.6  Mean cell Hemoglobin Conc33.9  Mean cell Volum 87.5  Platelet count-Automate 395  RBC Count 4.32  Red Cell Distrib Width13.7  WBC Count12.12  % Albumin, Urine--  Hematocrit 37.8  Hemoglobin 12.8  CBC  Date: 12-08-21 @ 04:30  Mean cell Gpqjndnkom52.5  Mean cell Hemoglobin Conc33.3  Mean cell Volum 88.4  Platelet count-Automate 393  RBC Count 4.31  Red Cell Distrib Width13.4  WBC Count11.17  % Albumin, Urine--  Hematocrit 38.1  Hemoglobin 12.7  CBC  Date: 12-07-21 @ 04:30  Mean cell Bnypmxkwfe39.7  Mean cell Hemoglobin Conc33.7  Mean cell Volum 88.0  Platelet count-Automate 336  RBC Count 4.18  Red Cell Distrib Width13.5  WBC Count10.10  % Albumin, Urine--  Hematocrit 36.8  Hemoglobin 12.4  CBC  Date: 12-06-21 @ 04:30  Mean cell Jaebbkyple65.8  Mean cell Hemoglobin Conc34.2  Mean cell Volum 87.0  Platelet count-Automate 311  RBC Count 4.23  Red Cell Distrib Width14.0  WBC Count8.14  % Albumin, Urine--  Hematocrit 36.8  Hemoglobin 12.6  CBC  Date: 12-05-21 @ 17:04  Mean cell Tvsqrxyltm64.0  Mean cell Hemoglobin Conc34.0  Mean cell Volum 88.2  Platelet count-Automate 274  RBC Count 4.14  Red Cell Distrib Width13.9  WBC Count6.58  % Albumin, Urine--  Hematocrit 36.5  Hemoglobin 12.4    [facility]  12-10-21 @ 07:04  Blood Gas Arterial-Calcium,Ionized--  Blood Urea Nitrogen, Serum 16 mg/dL [10 - 20]  Carbon Dioxide, Serum21 mmol/L [17 - 32]  Chloride, Sxjyp671 mmol/L [98 - 110]  Creatinie, Serum0.7 mg/dL [0.7 - 1.5]  Glucose, Serum93 mg/dL [70 - 99]  Potassium, Serum5.1 mmol/L<H> [3.5 - 5.0]  Sodium, Serum 135 mmol/L [135 - 146]  [facility]  12-09-21 @ 07:05  Blood Gas Arterial-Calcium,Ionized--  Blood Urea Nitrogen, Serum 17 mg/dL [10 - 20]  Carbon Dioxide, Serum20 mmol/L [17 - 32]  Chloride, Serum96 mmol/L<L> [98 - 110]  Creatinie, Serum0.7 mg/dL [0.7 - 1.5]  Glucose, Serum98 mg/dL [70 - 99]  Potassium, Serum5.0 mmol/L [3.5 - 5.0]  Sodium, Serum 130 mmol/L<L> [135 - 146]  [facility]  12-08-21 @ 04:30  Blood Gas Arterial-Calcium,Ionized--  Blood Urea Nitrogen, Serum 12 mg/dL [10 - 20]  Carbon Dioxide, Serum19 mmol/L [17 - 32]  Chloride, Serum99 mmol/L [98 - 110]  Creatinie, Serum0.7 mg/dL [0.7 - 1.5]  Glucose, Serum92 mg/dL [70 - 99]  Potassium, Serum4.3 mmol/L [3.5 - 5.0]  Sodium, Serum 133 mmol/L<L> [135 - 146]          Medications:  acetaminophen     Tablet .. 650 milliGRAM(s) Oral every 8 hours PRN  amLODIPine   Tablet 5 milliGRAM(s) Oral daily  dexAMETHasone     Tablet 6 milliGRAM(s) Oral daily  enoxaparin Injectable 40 milliGRAM(s) SubCutaneous every 12 hours  guaiFENesin  milliGRAM(s) Oral every 12 hours PRN  influenza  Vaccine (HIGH DOSE) 0.7 milliLiter(s) IntraMuscular once  melatonin 5 milliGRAM(s) Oral at bedtime  polyethylene glycol 3350 17 Gram(s) Oral daily  senna 2 Tablet(s) Oral at bedtime  sodium chloride 0.65% Nasal 1 Spray(s) Both Nostrils two times a day PRN        Assessment and Plan:  71 yr old male with hx of HTN admitted for COVID PNA    # AHRF secondary to  # COVID 19 PNA  - Symptomatic since around 11/30.   - CXR: b/l infiltrates  - Markers:  D-dimer 340 > 175, CRP 65, Ferritin 1034, Procalc 0.11      - RDV completed tx.   - IV dexa (since 12/4)- on d/c home switched to PO prednisone 40 mg once daily to complete total of 10 days course    12/4: 3L NC.   12/5: 5-6 L NC. Draw markers @ 4PM. Repeat CXR. c/w RDV, Dexa. Encourage I/S, proning.   12/6: 5L NC. c/w RDV, Dexa. Encourage I/S, proning.   12/7: 5-6L NC today. c/w RDV, Dexa. Desats on movement. repeat walking sats in 48 hours. RDV ends tomorrow.   12/08: 4 L at rest sat 93%, during ambulation on 4 L desat to 84%  12/09: 4 L at rest sat 93%, during ambulation on 3 L desat to 86%  12/10- on 4 L at rest sat 97%, during amb. on 4 L desat to 89%, will d/c home on home oxygen tx.  12/11- 4 L at rest sat 92-94%    # Mild Transaminitis  - likely secondary to viral infection     # Hyponatremia - 135 , stable levels    # HTN  - c/w amlodipine 5mg     # DASH diet     # DVT ppx   - Lovenox    # Ambulate as tolerated    # Full code    #Progress Note Handoff: Patient was medically optimized ,stable for d/c home today with home oxygen tx, as per case management, home oxygen will be delivered by monday.  Family discussion: yes Disposition: Home___on monday    Total time spent to complete patient's bedside assessment, review medical chart, discuss discharge  plan of care with covering medical team/case management  was more than 35 minutes .

## 2021-12-12 LAB
ALBUMIN SERPL ELPH-MCNC: 3.4 G/DL — LOW (ref 3.5–5.2)
ALP SERPL-CCNC: 66 U/L — SIGNIFICANT CHANGE UP (ref 30–115)
ALT FLD-CCNC: 43 U/L — HIGH (ref 0–41)
ANION GAP SERPL CALC-SCNC: 15 MMOL/L — HIGH (ref 7–14)
AST SERPL-CCNC: 20 U/L — SIGNIFICANT CHANGE UP (ref 0–41)
BASOPHILS # BLD AUTO: 0.05 K/UL — SIGNIFICANT CHANGE UP (ref 0–0.2)
BASOPHILS NFR BLD AUTO: 0.4 % — SIGNIFICANT CHANGE UP (ref 0–1)
BILIRUB SERPL-MCNC: 0.8 MG/DL — SIGNIFICANT CHANGE UP (ref 0.2–1.2)
BUN SERPL-MCNC: 16 MG/DL — SIGNIFICANT CHANGE UP (ref 10–20)
CALCIUM SERPL-MCNC: 8.5 MG/DL — SIGNIFICANT CHANGE UP (ref 8.5–10.1)
CHLORIDE SERPL-SCNC: 95 MMOL/L — LOW (ref 98–110)
CO2 SERPL-SCNC: 21 MMOL/L — SIGNIFICANT CHANGE UP (ref 17–32)
CREAT SERPL-MCNC: 0.8 MG/DL — SIGNIFICANT CHANGE UP (ref 0.7–1.5)
EOSINOPHIL # BLD AUTO: 0.06 K/UL — SIGNIFICANT CHANGE UP (ref 0–0.7)
EOSINOPHIL NFR BLD AUTO: 0.5 % — SIGNIFICANT CHANGE UP (ref 0–8)
GLUCOSE BLDC GLUCOMTR-MCNC: 148 MG/DL — HIGH (ref 70–99)
GLUCOSE BLDC GLUCOMTR-MCNC: 166 MG/DL — HIGH (ref 70–99)
GLUCOSE BLDC GLUCOMTR-MCNC: 207 MG/DL — HIGH (ref 70–99)
GLUCOSE BLDC GLUCOMTR-MCNC: 90 MG/DL — SIGNIFICANT CHANGE UP (ref 70–99)
GLUCOSE SERPL-MCNC: 80 MG/DL — SIGNIFICANT CHANGE UP (ref 70–99)
HCT VFR BLD CALC: 37.3 % — LOW (ref 42–52)
HGB BLD-MCNC: 12.4 G/DL — LOW (ref 14–18)
IMM GRANULOCYTES NFR BLD AUTO: 6.6 % — HIGH (ref 0.1–0.3)
LYMPHOCYTES # BLD AUTO: 0.84 K/UL — LOW (ref 1.2–3.4)
LYMPHOCYTES # BLD AUTO: 7.1 % — LOW (ref 20.5–51.1)
MAGNESIUM SERPL-MCNC: 2.2 MG/DL — SIGNIFICANT CHANGE UP (ref 1.8–2.4)
MCHC RBC-ENTMCNC: 29.5 PG — SIGNIFICANT CHANGE UP (ref 27–31)
MCHC RBC-ENTMCNC: 33.2 G/DL — SIGNIFICANT CHANGE UP (ref 32–37)
MCV RBC AUTO: 88.6 FL — SIGNIFICANT CHANGE UP (ref 80–94)
MONOCYTES # BLD AUTO: 0.94 K/UL — HIGH (ref 0.1–0.6)
MONOCYTES NFR BLD AUTO: 7.9 % — SIGNIFICANT CHANGE UP (ref 1.7–9.3)
NEUTROPHILS # BLD AUTO: 9.22 K/UL — HIGH (ref 1.4–6.5)
NEUTROPHILS NFR BLD AUTO: 77.5 % — HIGH (ref 42.2–75.2)
NRBC # BLD: 0 /100 WBCS — SIGNIFICANT CHANGE UP (ref 0–0)
PLATELET # BLD AUTO: 392 K/UL — SIGNIFICANT CHANGE UP (ref 130–400)
POTASSIUM SERPL-MCNC: 5.1 MMOL/L — HIGH (ref 3.5–5)
POTASSIUM SERPL-SCNC: 5.1 MMOL/L — HIGH (ref 3.5–5)
PROT SERPL-MCNC: 5.8 G/DL — LOW (ref 6–8)
RBC # BLD: 4.21 M/UL — LOW (ref 4.7–6.1)
RBC # FLD: 14.3 % — SIGNIFICANT CHANGE UP (ref 11.5–14.5)
SODIUM SERPL-SCNC: 131 MMOL/L — LOW (ref 135–146)
WBC # BLD: 11.89 K/UL — HIGH (ref 4.8–10.8)
WBC # FLD AUTO: 11.89 K/UL — HIGH (ref 4.8–10.8)

## 2021-12-12 PROCEDURE — 99233 SBSQ HOSP IP/OBS HIGH 50: CPT

## 2021-12-12 RX ADMIN — Medication 6 MILLIGRAM(S): at 05:34

## 2021-12-12 RX ADMIN — POLYETHYLENE GLYCOL 3350 17 GRAM(S): 17 POWDER, FOR SOLUTION ORAL at 11:02

## 2021-12-12 RX ADMIN — Medication 5 MILLIGRAM(S): at 21:28

## 2021-12-12 RX ADMIN — AMLODIPINE BESYLATE 5 MILLIGRAM(S): 2.5 TABLET ORAL at 05:34

## 2021-12-12 RX ADMIN — SENNA PLUS 2 TABLET(S): 8.6 TABLET ORAL at 21:28

## 2021-12-12 RX ADMIN — ENOXAPARIN SODIUM 40 MILLIGRAM(S): 100 INJECTION SUBCUTANEOUS at 05:34

## 2021-12-12 RX ADMIN — ENOXAPARIN SODIUM 40 MILLIGRAM(S): 100 INJECTION SUBCUTANEOUS at 17:14

## 2021-12-12 NOTE — PROGRESS NOTE ADULT - SUBJECTIVE AND OBJECTIVE BOX
DEMARCO CAMERON  [facility] T2-3A 017 A ([facility] T2-3A)      Patient was evaluated and examined  by bedside, no active complains          REVIEW OF SYSTEMS:  please see pertinent positives mentioned above, all other 12 ROS negative      T(C): , Max: 36.7 (12-11-21 @ 12:40)  HR: 49 (12-12-21 @ 04:49)  BP: 137/69 (12-12-21 @ 04:49)  RR: 18 (12-12-21 @ 04:49)  SpO2: 94% (12-12-21 @ 04:49)  CAPILLARY BLOOD GLUCOSE      POCT Blood Glucose.: 90 mg/dL (12 Dec 2021 07:26)  POCT Blood Glucose.: 229 mg/dL (11 Dec 2021 21:52)  POCT Blood Glucose.: 175 mg/dL (11 Dec 2021 16:28)      PHYSICAL EXAM:  General: NAD, AAOX3, patient is laying comfortably in bed  HEENT: AT, NC, Supple, NO JVD, NO CB  Lungs: mild decreased breath sounds  B/L, no wheezing, no rhonchi  CVS: normal S1, S2, RRR, NO M/G/R  Abdomen: soft, bowel sounds present, non-tender, non-distended  Extremities: no edema, no clubbing, no cyanosis, positive peripheral pulses b/l  Neuro: no acute focal neurological deficits  Skin: no rash, no ecchymosis      LAB  CBC  Date: 12-12-21 @ 08:30  Mean cell Nlkvwrhwvv51.5  Mean cell Hemoglobin Conc33.2  Mean cell Volum 88.6  Platelet count-Automate 392  RBC Count 4.21  Red Cell Distrib Width14.3  WBC Count11.89  % Albumin, Urine--  Hematocrit 37.3  Hemoglobin 12.4  CBC  Date: 12-11-21 @ 11:00  Mean cell Dwyomlbsho34.4  Mean cell Hemoglobin Conc33.1  Mean cell Volum 88.7  Platelet count-Automate 408  RBC Count 4.08  Red Cell Distrib Width14.2  WBC Count12.48  % Albumin, Urine--  Hematocrit 36.2  Hemoglobin 12.0  CBC  Date: 12-10-21 @ 07:04  Mean cell Eleeqhjiww18.8  Mean cell Hemoglobin Conc33.9  Mean cell Volum 88.0  Platelet count-Automate 416  RBC Count 4.16  Red Cell Distrib Width13.9  WBC Count13.81  % Albumin, Urine--  Hematocrit 36.6  Hemoglobin 12.4        [facility]  12-12-21 @ 08:30  Blood Gas Arterial-Calcium,Ionized--  Blood Urea Nitrogen, Serum 16 mg/dL [10 - 20]  Carbon Dioxide, Serum21 mmol/L [17 - 32]  Chloride, Serum95 mmol/L<L> [98 - 110]  Creatinie, Serum0.8 mg/dL [0.7 - 1.5]  Glucose, Serum80 mg/dL [70 - 99]  Potassium, Serum5.1 mmol/L<H> [3.5 - 5.0]  Sodium, Serum 131 mmol/L<L> [135 - 146]  [facility]  12-11-21 @ 11:00  Blood Gas Arterial-Calcium,Ionized--  Blood Urea Nitrogen, Serum 18 mg/dL [10 - 20]  Carbon Dioxide, Serum18 mmol/L [17 - 32]  Chloride, Serum95 mmol/L<L> [98 - 110]  Creatinie, Serum0.7 mg/dL [0.7 - 1.5]  Glucose, Fffpl713 mg/dL<H> [70 - 99]  Potassium, Serum4.8 mmol/L [3.5 - 5.0]  Sodium, Serum 128 mmol/L<L> [135 - 146]      Medications:  acetaminophen     Tablet .. 650 milliGRAM(s) Oral every 8 hours PRN  amLODIPine   Tablet 5 milliGRAM(s) Oral daily  enoxaparin Injectable 40 milliGRAM(s) SubCutaneous every 12 hours  guaiFENesin  milliGRAM(s) Oral every 12 hours PRN  influenza  Vaccine (HIGH DOSE) 0.7 milliLiter(s) IntraMuscular once  melatonin 5 milliGRAM(s) Oral at bedtime  polyethylene glycol 3350 17 Gram(s) Oral daily  senna 2 Tablet(s) Oral at bedtime  sodium chloride 0.65% Nasal 1 Spray(s) Both Nostrils two times a day PRN        Assessment and Plan:  72 y/o male with hx of HTN admitted for COVID PNA    # AHRF secondary to  # COVID 19 PNA  - Symptomatic since around 11/30.   - CXR: b/l infiltrates  - Markers:  D-dimer 340 > 175, CRP 65, Ferritin 1034, Procalc 0.11      - RDV completed tx.   - IV dexa (since 12/4)- on d/c home switched to PO prednisone 40 mg once daily to complete total of 10 days course    12/4: 3L NC.   12/5: 5-6 L NC. Draw markers @ 4PM. Repeat CXR. c/w RDV, Dexa. Encourage I/S, proning.   12/6: 5L NC. c/w RDV, Dexa. Encourage I/S, proning.   12/7: 5-6L NC today. c/w RDV, Dexa. Desats on movement. repeat walking sats in 48 hours. RDV ends tomorrow.   12/08: 4 L at rest sat 93%, during ambulation on 4 L desat to 84%  12/09: 4 L at rest sat 93%, during ambulation on 3 L desat to 86%  12/10- on 4 L at rest sat 97%, during amb. on 4 L desat to 89%, will d/c home on home oxygen tx.  12/11- 4 L at rest sat 92-94%  12/12- 4 L at rest sat 93%    # Mild Transaminitis  - likely secondary to viral infection     # Hyponatremia -  stable levels    # HTN  - c/w amlodipine 5mg     # DASH diet     # DVT ppx   - Lovenox    # Ambulate as tolerated    # Full code    #Progress Note Handoff: Patient was medically optimized ,stable for d/c home today with home oxygen tx, as per case management, home oxygen will be delivered by monday.  Family discussion: yes Disposition: Home___on monday    Total time spent to complete patient's bedside assessment, review medical chart, discuss discharge  plan of care with covering medical team/case management  was more than 35 minutes .

## 2021-12-13 VITALS
HEART RATE: 66 BPM | SYSTOLIC BLOOD PRESSURE: 114 MMHG | DIASTOLIC BLOOD PRESSURE: 65 MMHG | RESPIRATION RATE: 18 BRPM | TEMPERATURE: 97 F | OXYGEN SATURATION: 95 %

## 2021-12-13 LAB
ALBUMIN SERPL ELPH-MCNC: 3.4 G/DL — LOW (ref 3.5–5.2)
ALP SERPL-CCNC: 65 U/L — SIGNIFICANT CHANGE UP (ref 30–115)
ALT FLD-CCNC: 46 U/L — HIGH (ref 0–41)
ANION GAP SERPL CALC-SCNC: 13 MMOL/L — SIGNIFICANT CHANGE UP (ref 7–14)
AST SERPL-CCNC: 20 U/L — SIGNIFICANT CHANGE UP (ref 0–41)
BASOPHILS # BLD AUTO: 0.04 K/UL — SIGNIFICANT CHANGE UP (ref 0–0.2)
BASOPHILS NFR BLD AUTO: 0.4 % — SIGNIFICANT CHANGE UP (ref 0–1)
BILIRUB SERPL-MCNC: 0.7 MG/DL — SIGNIFICANT CHANGE UP (ref 0.2–1.2)
BUN SERPL-MCNC: 18 MG/DL — SIGNIFICANT CHANGE UP (ref 10–20)
CALCIUM SERPL-MCNC: 8.7 MG/DL — SIGNIFICANT CHANGE UP (ref 8.5–10.1)
CHLORIDE SERPL-SCNC: 94 MMOL/L — LOW (ref 98–110)
CO2 SERPL-SCNC: 21 MMOL/L — SIGNIFICANT CHANGE UP (ref 17–32)
CREAT SERPL-MCNC: 0.7 MG/DL — SIGNIFICANT CHANGE UP (ref 0.7–1.5)
EOSINOPHIL # BLD AUTO: 0.02 K/UL — SIGNIFICANT CHANGE UP (ref 0–0.7)
EOSINOPHIL NFR BLD AUTO: 0.2 % — SIGNIFICANT CHANGE UP (ref 0–8)
GLUCOSE BLDC GLUCOMTR-MCNC: 107 MG/DL — HIGH (ref 70–99)
GLUCOSE BLDC GLUCOMTR-MCNC: 140 MG/DL — HIGH (ref 70–99)
GLUCOSE BLDC GLUCOMTR-MCNC: 83 MG/DL — SIGNIFICANT CHANGE UP (ref 70–99)
GLUCOSE SERPL-MCNC: 90 MG/DL — SIGNIFICANT CHANGE UP (ref 70–99)
HCT VFR BLD CALC: 37 % — LOW (ref 42–52)
HGB BLD-MCNC: 12.5 G/DL — LOW (ref 14–18)
IMM GRANULOCYTES NFR BLD AUTO: 6.4 % — HIGH (ref 0.1–0.3)
LYMPHOCYTES # BLD AUTO: 1.07 K/UL — LOW (ref 1.2–3.4)
LYMPHOCYTES # BLD AUTO: 10.3 % — LOW (ref 20.5–51.1)
MAGNESIUM SERPL-MCNC: 2.5 MG/DL — HIGH (ref 1.8–2.4)
MCHC RBC-ENTMCNC: 29.8 PG — SIGNIFICANT CHANGE UP (ref 27–31)
MCHC RBC-ENTMCNC: 33.8 G/DL — SIGNIFICANT CHANGE UP (ref 32–37)
MCV RBC AUTO: 88.1 FL — SIGNIFICANT CHANGE UP (ref 80–94)
MONOCYTES # BLD AUTO: 0.89 K/UL — HIGH (ref 0.1–0.6)
MONOCYTES NFR BLD AUTO: 8.6 % — SIGNIFICANT CHANGE UP (ref 1.7–9.3)
NEUTROPHILS # BLD AUTO: 7.7 K/UL — HIGH (ref 1.4–6.5)
NEUTROPHILS NFR BLD AUTO: 74.1 % — SIGNIFICANT CHANGE UP (ref 42.2–75.2)
NRBC # BLD: 0 /100 WBCS — SIGNIFICANT CHANGE UP (ref 0–0)
PLATELET # BLD AUTO: 351 K/UL — SIGNIFICANT CHANGE UP (ref 130–400)
POTASSIUM SERPL-MCNC: 4.9 MMOL/L — SIGNIFICANT CHANGE UP (ref 3.5–5)
POTASSIUM SERPL-SCNC: 4.9 MMOL/L — SIGNIFICANT CHANGE UP (ref 3.5–5)
PROT SERPL-MCNC: 6 G/DL — SIGNIFICANT CHANGE UP (ref 6–8)
RBC # BLD: 4.2 M/UL — LOW (ref 4.7–6.1)
RBC # FLD: 14.2 % — SIGNIFICANT CHANGE UP (ref 11.5–14.5)
SODIUM SERPL-SCNC: 128 MMOL/L — LOW (ref 135–146)
WBC # BLD: 10.38 K/UL — SIGNIFICANT CHANGE UP (ref 4.8–10.8)
WBC # FLD AUTO: 10.38 K/UL — SIGNIFICANT CHANGE UP (ref 4.8–10.8)

## 2021-12-13 PROCEDURE — 99239 HOSP IP/OBS DSCHRG MGMT >30: CPT | Mod: GC

## 2021-12-13 RX ADMIN — AMLODIPINE BESYLATE 5 MILLIGRAM(S): 2.5 TABLET ORAL at 05:36

## 2021-12-13 RX ADMIN — ENOXAPARIN SODIUM 40 MILLIGRAM(S): 100 INJECTION SUBCUTANEOUS at 05:36

## 2021-12-13 RX ADMIN — ENOXAPARIN SODIUM 40 MILLIGRAM(S): 100 INJECTION SUBCUTANEOUS at 17:24

## 2021-12-13 RX ADMIN — Medication 5 MILLIGRAM(S): at 22:16

## 2021-12-13 RX ADMIN — POLYETHYLENE GLYCOL 3350 17 GRAM(S): 17 POWDER, FOR SOLUTION ORAL at 12:04

## 2021-12-13 RX ADMIN — SENNA PLUS 2 TABLET(S): 8.6 TABLET ORAL at 22:17

## 2021-12-13 NOTE — DISCHARGE NOTE PROVIDER - NSDCMRMEDTOKEN_GEN_ALL_CORE_FT
amLODIPine 10 mg oral tablet: 1 tab(s) orally once a day  Vitamin B12 1000 mcg oral tablet: 1 tab(s) orally once a day  Vitamin D3:

## 2021-12-13 NOTE — DISCHARGE NOTE PROVIDER - NSDCCPCAREPLAN_GEN_ALL_CORE_FT
PRINCIPAL DISCHARGE DIAGNOSIS  Diagnosis: 2019 novel coronavirus disease (COVID-19)  Assessment and Plan of Treatment: Coronavirus disease 2019 (COVID-19) is a respiratory illness  that can spread from person to person. The virus that causes  COVID-19 is a novel coronavirus that was first identified during  an investigation into an outbreak in [place], [place].  The virus that causes COVID-19 probably emerged from an  animal source, but is now spreading from person to person.  The virus is thought to spread mainly between people who  are in close contact with one another (within about 6 feet)  through respiratory droplets produced when an infected  person coughs or sneezes. It also may be possible that a person  can get COVID-19 by touching a surface or object that has  the virus on it and then touching their own mouth, nose, or  possibly their eyes, but this is not thought to be the main  way the virus spreads.  Please stay home and avoid contact with others for at least a week after symptoms resolve and follow government guidelines.   Patients with COVID-19 have had mild to severe respiratory  illness with symptoms of  • fever  • cough  • shortness of breath  People can help protect themselves from respiratory illness with  everyday preventive actions.    • Avoid close contact with people who are sick.  • Avoid touching your eyes, nose, and mouth with  unwashed hands.  • Wash your hands often with soap and water for at least 20   seconds. Use an alcohol-based hand  that contains at  least 60% alcohol if soap and water are not available.   Stay home when you are sick.  • Cover your cough or sneeze with a tissue, then throw the  tissue in the trash.  • Clean and disinfect frequently touched objects  and surfaces.  Call 911 and inform them you are covid positive before you decide to go to the emergency room if you have chest pain, difficulty breathing, high fevers, worsening of your symptoms, feel unwell, or have nausea and vomiting.

## 2021-12-13 NOTE — PROGRESS NOTE ADULT - ATTENDING COMMENTS
71 yr old male with hx of HTN admitted for COVID PNA    # AHRF secondary to  # COVID 19 PNA  - Symptomatic since around 11/30.   - On admission requiring 3L NC  - CXR: b/l infiltrates  - Markers:  D-dimer 340 > 175  CRP 65  Ferritin 1034  Procalc 0.11      - RDV (since 12/4)  - IV dexa (since 12/4)    12/4: 3L NC.   12/5: 5-6 L NC. Draw markers @ 4PM. Repeat CXR. c/w RDV, Dexa. Encourage I/S, proning.   12/6: 5L NC. c/w RDV, Dexa. Encourage I/S, proning.   12/7: 5-6L NC today. c/w RDV, Dexa. Desats on movement. repeat walking sats in 48 hours. RDV ends tomorrow.     # Mild Transaminitis  - likely secondary to viral infection     # Hyponatremia   - improved from 128 > IVFs > 131 (12/4) > 131 (12/6)   Watch off IVFs now.   ?SiADH    # HTN  - c/w amlodipine 5mg     # DASH diet     # DVT ppx   - start Lovenox    # Ambulate as tolerated    # Full code  Desats on movement. repeat walking sats in 48 hours. RDV ends tomorrow
72 y/o male with hx of HTN admitted for COVID PNA    # AHRF secondary to  # COVID 19 PNA  - Symptomatic since around 11/30.   - CXR: b/l infiltrates  - Markers:  D-dimer 340 > 175, CRP 65, Ferritin 1034, Procalc 0.11      - RDV completed tx.   - IV dexa (since 12/4)- on d/c home switched to PO prednisone 40 mg once daily to complete total of 10 days course    12/13- on 4 L sat 92-94%    # Mild Transaminitis  - likely secondary to viral infection     # Hyponatremia -  stable levels    # HTN  - c/w amlodipine 5mg     # DASH diet     # DVT ppx   - Lovenox    # Ambulate as tolerated    # Full code    #Progress Note Handoff: Patient was medically optimized ,stable for d/c home today with home oxygen tx, as per case management, home oxygen will be delivered today   Family discussion: yes Disposition: Home__today     Total time spent to complete patient's bedside assessment, review medical chart, discuss discharge  plan of care with covering medical team/case management  was more than 35 minutes .
71 yr old male with hx of HTN admitted for COVID PNA    # AHRF secondary to  # COVID 19 PNA  - Symptomatic since around 11/30.   - CXR: b/l infiltrates  - Markers:  D-dimer 340 > 175, CRP 65, Ferritin 1034, Procalc 0.11      - RDV completed tx.   - IV dexa (since 12/4)- on d/c home switched to PO prednisone 40 mg once daily to complete total of 10 days course    12/4: 3L NC.   12/5: 5-6 L NC. Draw markers @ 4PM. Repeat CXR. c/w RDV, Dexa. Encourage I/S, proning.   12/6: 5L NC. c/w RDV, Dexa. Encourage I/S, proning.   12/7: 5-6L NC today. c/w RDV, Dexa. Desats on movement. repeat walking sats in 48 hours. RDV ends tomorrow.   12/08: 4 L at rest sat 93%, during ambulation on 4 L desat to 84%  12/09: 4 L at rest sat 93%, during ambulation on 3 L desat to 86%  12/10- on 4 L at rest sat 97%, during amb. on 4 L desat to 89%, will d/c home on home oxygen tx.    # Mild Transaminitis  - likely secondary to viral infection     # Hyponatremia - 135 , stable levels    # HTN  - c/w amlodipine 5mg     # DASH diet     # DVT ppx   - Lovenox    # Ambulate as tolerated    # Full code    #Progress Note Handoff: Patient was medically optimized ,stable for d/c home today with home oxygen tx.  Family discussion: yes Disposition: Home___today    Total time spent to complete patient's bedside assessment, review medical chart, discuss discharge  plan of care with covering medical team/case management  was more than 35 minutes

## 2021-12-13 NOTE — PROGRESS NOTE ADULT - PROVIDER SPECIALTY LIST ADULT
Hospitalist
Internal Medicine
Internal Medicine
Hospitalist
Internal Medicine

## 2021-12-13 NOTE — DISCHARGE NOTE PROVIDER - HOSPITAL COURSE
This is a 72 y/o M with PMHx of HTN admitted for COVID-19 PNA     #Severe COVID-19 Pneumonia  #Acute Hypoxemic Respiratory Failure  - CXR demonstrated b/l infiltrates consistent with COVID-19 pneumonia.   - s/p RDV 12/4-12/8.   - Most recent inflammatory markers: D-dimer 186, CRP 14, Ferritin 631, Procalc 0.05  - s/p IV dexamethasone (since 12/4), completing 10 day course of  PO prednisone 40 mg once daily   - c/w 4L NC; requiring Home O2   - home oxygen delivery on Monday 12/13    #Mild Transaminitis  - likely secondary to viral infection     #HTN  - c/w amlodipine 5mg

## 2021-12-13 NOTE — PROGRESS NOTE ADULT - ASSESSMENT
This is a 70 y/o M with PMHx of HTN admitted for COVID-19 PNA     #Severe COVID-19 Pneumonia  #Acute Hypoxemic Respiratory Failure  - CXR demonstrated b/l infiltrates consistent with COVID-19 pneumonia.   - s/p RDV 12/4-12/8.   - Most recent inflammatory markers: D-dimer 186, CRP 14, Ferritin 631, Procalc 0.05  - s/p IV dexamethasone (since 12/4), completing 10 day course of  PO prednisone 40 mg once daily   - c/w 4L NC; requiring Home O2   - home oxygen delivery on Monday 12/13    #Mild Transaminitis  - likely secondary to viral infection     #HTN  - c/w amlodipine 5mg     #Misc  - DVT prophylaxis: Lovenox 40mg SQ BID  - GI prophylaxis: pantoprazole 40mg PO QD  - DIET: DASH/TLC  - ACTIVITY: IAT  - CODE STATUS: Full Code  Pending: home oxygen delivery on Monday

## 2021-12-13 NOTE — PROGRESS NOTE ADULT - SUBJECTIVE AND OBJECTIVE BOX
----------Daily Progress Note----------    HISTORY OF PRESENT ILLNESS:  Patient is a 71y old Male who presents with a chief complaint of sob (12 Dec 2021 09:59)    Currently admitted to medicine with the primary diagnosis of 2019 novel coronavirus disease (COVID-19)    71 yr old male with hx of HTN presented to ER for worsening fever and sob for past 2 days. Patient reports having flu like symptoms for past 1 week days, associated with dec PO intake and appetite Patient has been taking Tylenol/Advil for symptomatic relief. Today complaining of sob on exertion and worsening dry cough prompting ER visit. Pt not vaccinated.         Today is hospital day 10d.     INTERVAL HOSPITAL COURSE / OVERNIGHT EVENTS:    Patient was examined and seen at bedside. This morning he is resting comfortably in bed and reports no new issues or overnight events.     Review of Systems: Patient is on 4L NC, denies fever, chills, headache, dizziness. Patient chest pain, palpitation, SOB     <<<<<PAST MEDICAL & SURGICAL HISTORY>>>>>  Hypertension    No significant past surgical history      ALLERGIES  No Known Allergies      Home Medications:  amLODIPine 10 mg oral tablet: 1 tab(s) orally once a day (03 Dec 2021 19:09)  Vitamin B12 1000 mcg oral tablet: 1 tab(s) orally once a day (03 Dec 2021 19:09)  Vitamin D3:  (03 Dec 2021 19:09)        MEDICATIONS  STANDING MEDICATIONS  amLODIPine   Tablet 5 milliGRAM(s) Oral daily  enoxaparin Injectable 40 milliGRAM(s) SubCutaneous every 12 hours  influenza  Vaccine (HIGH DOSE) 0.7 milliLiter(s) IntraMuscular once  melatonin 5 milliGRAM(s) Oral at bedtime  polyethylene glycol 3350 17 Gram(s) Oral daily  senna 2 Tablet(s) Oral at bedtime    PRN MEDICATIONS  acetaminophen     Tablet .. 650 milliGRAM(s) Oral every 8 hours PRN  guaiFENesin  milliGRAM(s) Oral every 12 hours PRN  sodium chloride 0.65% Nasal 1 Spray(s) Both Nostrils two times a day PRN    VITALS:  T(F): 97.1  HR: 50  BP: 136/71  RR: 17  SpO2: 92%    <<<<<LABS>>>>>                        12.5   10.38 )-----------( 351      ( 13 Dec 2021 07:30 )             37.0     12-13    128<L>  |  94<L>  |  18  ----------------------------<  90  4.9   |  21  |  0.7    Ca    8.7      13 Dec 2021 07:30  Mg     2.5     12-13    TPro  6.0  /  Alb  3.4<L>  /  TBili  0.7  /  DBili  x   /  AST  20  /  ALT  46<H>  /  AlkPhos  65  12-13            [id number]        <<<<<RADIOLOGY>>>>>  < from: Xray Chest 1 View-PORTABLE IMMEDIATE (Xray Chest 1 View-PORTABLE IMMEDIATE .) (12.05.21 @ 17:26) >  Impression:    Stable, bilateral interstitial pulmonary opacities.        --- End of Report ---    < end of copied text >    < from: CT Head No Cont (12.03.21 @ 21:10) >  IMPRESSION:    No acute intracranial pathology. Recommend further evaluation with MRI if there is continued clinical concern for small acute infarct.    --- End of Report ---    < end of copied text >        <<<<<PHYSICAL EXAM>>>>>  GENERAL: Well developed, well nourished and in no acute distress. Resting comfortably in bed.  PULMONARY: Clear to auscultation bilaterally. No rales, rhonchi, or wheezing.  CARDIOVASCULAR: Regular rate and rhythm, S1-S2, no murmurs  GASTROINTESTINAL: Soft, non-tender, non-distended, no guarding.  SKIN/EXTREMITIES: No clubbing or edema  NEUROLOGIC/MUSCULOSKELETAL: AOx4, grossly moving all extremities, no focal deficits.      -----------------------------------------------------------------------------------------------------------------------------------------------------------------------------------------------

## 2021-12-13 NOTE — DISCHARGE NOTE PROVIDER - CARE PROVIDER_API CALL
Michell Dunlap)  Geriatric Medicine; Internal Medicine  [address] [place] [address]  Phone: [phone]  Fax: [phone]  Established Patient  Follow Up Time: 2 weeks

## 2021-12-16 ENCOUNTER — APPOINTMENT (OUTPATIENT)
Dept: GERIATRICS | Facility: CLINIC | Age: 71
End: 2021-12-16

## 2021-12-21 PROBLEM — I10 ESSENTIAL (PRIMARY) HYPERTENSION: Chronic | Status: ACTIVE | Noted: 2021-12-03

## 2022-01-12 ENCOUNTER — OUTPATIENT (OUTPATIENT)
Dept: OUTPATIENT SERVICES | Facility: HOSPITAL | Age: 72
LOS: 1 days | Discharge: HOME | End: 2022-01-12

## 2022-01-12 ENCOUNTER — APPOINTMENT (OUTPATIENT)
Dept: GERIATRICS | Facility: CLINIC | Age: 72
End: 2022-01-12
Payer: COMMERCIAL

## 2022-01-12 DIAGNOSIS — J96.11 CHRONIC RESPIRATORY FAILURE WITH HYPOXIA: ICD-10-CM

## 2022-01-12 DIAGNOSIS — J12.82 COVID-19: ICD-10-CM

## 2022-01-12 DIAGNOSIS — U07.1 COVID-19: ICD-10-CM

## 2022-01-12 PROCEDURE — 99443: CPT | Mod: GC

## 2022-01-12 RX ORDER — ERGOCALCIFEROL 1.25 MG/1
1.25 MG CAPSULE, LIQUID FILLED ORAL
Qty: 8 | Refills: 0 | Status: ACTIVE | COMMUNITY
Start: 2022-01-12 | End: 1900-01-01

## 2022-01-12 RX ORDER — ALBUTEROL SULFATE 90 UG/1
108 (90 BASE) AEROSOL, METERED RESPIRATORY (INHALATION)
Qty: 1 | Refills: 0 | Status: ACTIVE | COMMUNITY
Start: 2022-01-12 | End: 1900-01-01

## 2022-01-12 RX ORDER — TAMSULOSIN HYDROCHLORIDE 0.4 MG/1
0.4 CAPSULE ORAL
Qty: 90 | Refills: 3 | Status: ACTIVE | COMMUNITY
Start: 2020-09-30 | End: 1900-01-01

## 2022-01-12 RX ORDER — FINASTERIDE 5 MG/1
5 TABLET, FILM COATED ORAL DAILY
Qty: 90 | Refills: 0 | Status: ACTIVE | COMMUNITY
Start: 2021-06-17 | End: 1900-01-01

## 2022-01-12 RX ORDER — AMLODIPINE BESYLATE 10 MG/1
10 TABLET ORAL
Qty: 90 | Refills: 3 | Status: ACTIVE | COMMUNITY
Start: 2020-09-30 | End: 1900-01-01

## 2022-01-12 NOTE — HISTORY OF PRESENT ILLNESS
[Home] : at home, [unfilled] , at the time of the visit. [Other Location: e.g. Home (Enter Location, City,State)___] : at [unfilled] [FreeTextEntry1] : 71 year old male patient\par - BPH. Home meds Tamsulosin 0.4 mg QD and finasteride \par - HTN. Home med Amlodipine 10mg QD\par \par He is presenting for a telephonic visit today for follow up.\par patient has been taking his medications but is still having LUT symptoms with difficulty initiating stream, intermittence and PVR. He denied fevers, chills, or hematuria. He also was hospitalized in December for COVID PNA and was discharged after 10 days stay on home oxygen. As per the daughter he has been off oxygen for the past 2 weeks and he is maintaining his spo2 levels above 94 even after ambulation. She mentions though a worsening cough since his hospital discharge with no shortness of breath.  No other complaints.

## 2022-01-12 NOTE — PHYSICAL EXAM
[Alert] : alert [No Acute Distress] : in no acute distress [No Respiratory Distress] : no respiratory distress

## 2022-01-12 NOTE — REVIEW OF SYSTEMS
[Cough] : cough [Hesitancy] : urinary hesitancy [Nocturia] : nocturia [Negative] : Heme/Lymph [Fever] : no fever [Chills] : no chills [Feeling Poorly] : not feeling poorly [Feeling Tired] : not feeling tired [Recent Weight Loss (___ Lbs)] : no recent weight loss [Eye Pain] : no eye pain [Red Eyes] : eyes not red [Earache] : no earache [Loss Of Hearing] : no hearing loss [Nosebleeds] : no nosebleeds [Heart Rate Is Slow] : the heart rate was not slow [Heart Rate Is Fast] : the heart rate was not fast [Chest Pain] : no chest pain [Palpitations] : no palpitations [Shortness Of Breath] : no shortness of breath [SOB on Exertion] : no shortness of breath during exertion [PND] : no PND [Dysuria] : no dysuria [Incontinence] : no incontinence [Genital Lesion] : no genital lesions [Testicular Pain] : no testicular pain

## 2022-01-12 NOTE — ASSESSMENT
[FreeTextEntry1] : Case of a 71 year old male patient with BPH and HTN presenting for telephonic visit for follow up.\par \par BPH. \par * Home meds Tamsulosin 0.4 mg QD and finasteride 5 mg qd\par - persistence of LUT symptoms\par - Urology referral\par \par COVID PNA with chronic respiratory failure\par - hospitalized for 10 days in December and discharge on home oxygen\par - saturating well on RA as per the daughter and has not required oxygen supplementation for the past 2 weeks\par - persisting cough that is worsening\par - possible hyperreactive airway disease post covid\par - start ventolin prn\par - refer to pulmonary\par \par HTN\par * Well Controlled. \par * Home med Amlodipine 10mg QD\par - Monitor BP closely\par - Continue Amlodipine 10mg QD\par - Educated about DASH diet and exercise\par \par # HLD\par - lipitor 10 mg started\par \par # Vit D deficiency\par - start vit D [id number] weekly for 8 weeks\par \par HCM\par - Refused colonoscopy in past and currently\par - Received PPSV 11/02/20\par - Received Tdap 11/02/20

## 2022-03-31 ENCOUNTER — OUTPATIENT (OUTPATIENT)
Dept: OUTPATIENT SERVICES | Facility: HOSPITAL | Age: 72
LOS: 1 days | Discharge: HOME | End: 2022-03-31

## 2022-03-31 ENCOUNTER — APPOINTMENT (OUTPATIENT)
Dept: UROLOGY | Facility: CLINIC | Age: 72
End: 2022-03-31
Payer: MEDICAID

## 2022-03-31 VITALS
OXYGEN SATURATION: 95 % | SYSTOLIC BLOOD PRESSURE: 143 MMHG | DIASTOLIC BLOOD PRESSURE: 77 MMHG | HEART RATE: 56 BPM | HEIGHT: 64 IN | BODY MASS INDEX: 32.95 KG/M2 | WEIGHT: 193 LBS | TEMPERATURE: 97.3 F

## 2022-03-31 DIAGNOSIS — N39.41 URGE INCONTINENCE: ICD-10-CM

## 2022-03-31 PROCEDURE — 99204 OFFICE O/P NEW MOD 45 MIN: CPT

## 2022-03-31 NOTE — ASSESSMENT
[FreeTextEntry1] : 71 yo patient patient has been taking his medications but is still having LUT symptoms with difficulty initiating stream, intermittence and PVR. post void dribbling \par \par on tamsulosin and finasteride -- not fully satisfied with urination\par \par He also was hospitalized in December for COVID PNA and was discharged after 10 days stay on home oxygen. As per the daughter he has been off oxygen for the past 2 weeks and he is maintaining his spo2 levels above 94 even after ambulation. She mentions though a worsening cough since his hospital discharge with no shortness of breath. No other complaints. \par

## 2022-03-31 NOTE — HISTORY OF PRESENT ILLNESS
[FreeTextEntry1] : 73 yo patient patient has been taking his medications but is still having LUT symptoms with difficulty initiating stream, intermittence and PVR. post void dribbling \par \par on tamsulosin and finasteride -- not fully satisfied with urination\par \par He also was hospitalized in December for COVID PNA and was discharged after 10 days stay on home oxygen. As per the daughter he has been off oxygen for the past 2 weeks and he is maintaining his spo2 levels above 94 even after ambulation. She mentions though a worsening cough since his hospital discharge with no shortness of breath. No other complaints. \par

## 2022-04-08 ENCOUNTER — OUTPATIENT (OUTPATIENT)
Dept: OUTPATIENT SERVICES | Facility: HOSPITAL | Age: 72
LOS: 1 days | Discharge: HOME | End: 2022-04-08
Payer: SUBSIDIZED

## 2022-04-08 DIAGNOSIS — N40.0 BENIGN PROSTATIC HYPERPLASIA WITHOUT LOWER URINARY TRACT SYMPTOMS: ICD-10-CM

## 2022-04-08 PROCEDURE — 76857 US EXAM PELVIC LIMITED: CPT | Mod: 26

## 2022-05-10 LAB
APPEARANCE: CLEAR
BACTERIA UR CULT: NORMAL
BILIRUBIN URINE: NEGATIVE
BLOOD URINE: NEGATIVE
COLOR: NORMAL
GLUCOSE QUALITATIVE U: NEGATIVE
KETONES URINE: NEGATIVE
LEUKOCYTE ESTERASE URINE: NEGATIVE
NITRITE URINE: NEGATIVE
PH URINE: 6.5
PROTEIN URINE: NEGATIVE
SPECIFIC GRAVITY URINE: 1.02
UROBILINOGEN URINE: NORMAL

## 2022-06-02 ENCOUNTER — OUTPATIENT (OUTPATIENT)
Dept: OUTPATIENT SERVICES | Facility: HOSPITAL | Age: 72
LOS: 1 days | Discharge: HOME | End: 2022-06-02

## 2022-06-02 ENCOUNTER — APPOINTMENT (OUTPATIENT)
Dept: UROLOGY | Facility: CLINIC | Age: 72
End: 2022-06-02
Payer: SUBSIDIZED

## 2022-06-02 VITALS
OXYGEN SATURATION: 96 % | BODY MASS INDEX: 34.15 KG/M2 | HEIGHT: 64 IN | SYSTOLIC BLOOD PRESSURE: 138 MMHG | WEIGHT: 200 LBS | DIASTOLIC BLOOD PRESSURE: 84 MMHG | HEART RATE: 52 BPM | TEMPERATURE: 97.2 F

## 2022-06-02 DIAGNOSIS — N40.0 BENIGN PROSTATIC HYPERPLASIA WITHOUT LOWER URINARY TRACT SYMPTOMS: ICD-10-CM

## 2022-06-02 DIAGNOSIS — R39.198 OTHER DIFFICULTIES WITH MICTURITION: ICD-10-CM

## 2022-06-02 PROCEDURE — 99214 OFFICE O/P EST MOD 30 MIN: CPT

## 2022-06-02 NOTE — ASSESSMENT
[FreeTextEntry1] : 73 yo patient patient has been taking his medications but is still having LUT symptoms with difficulty initiating stream, intermittence and PVR. post void dribbling \par \par on tamsulosin and finasteride -- not fully satisfied with urination\par \par He also was hospitalized in December for COVID PNA and was discharged after 10 days stay on home oxygen. As per the daughter he has been off oxygen for the past 2 weeks and he is maintaining his spo2 levels above 94 even after ambulation. She mentions though a worsening cough since his hospital discharge with no shortness of breath. No other complaints. \par  \par March/april 2022\par US Urinary Bladder; prostate 88g with pvr of 31ml\par Culture - Urine; negative\par Urinalysis; neg Nit, and neg LE

## 2022-06-02 NOTE — HISTORY OF PRESENT ILLNESS
[FreeTextEntry1] : 71 yo patient patient has been taking his medications but is still having LUT symptoms with difficulty initiating stream, intermittence and PVR. post void dribbling \par \par on tamsulosin and finasteride -- not fully satisfied with urination\par \par He also was hospitalized in December for COVID PNA and was discharged after 10 days stay on home oxygen. As per the daughter he has been off oxygen for the past 2 weeks and he is maintaining his spo2 levels above 94 even after ambulation. She mentions though a worsening cough since his hospital discharge with no shortness of breath. No other complaints. \par  \par March/april 2022\par US Urinary Bladder; prostate 88g with pvr of 31ml\par Culture - Urine;\par Urinalysis; neg Nit, and neg LE

## 2022-06-15 ENCOUNTER — APPOINTMENT (OUTPATIENT)
Dept: GERIATRICS | Facility: CLINIC | Age: 72
End: 2022-06-15

## 2022-06-27 ENCOUNTER — NON-APPOINTMENT (OUTPATIENT)
Age: 72
End: 2022-06-27

## 2022-07-21 DIAGNOSIS — N40.0 BENIGN PROSTATIC HYPERPLASIA WITHOUT LOWER URINARY TRACT SYMPTOMS: ICD-10-CM

## 2022-07-21 DIAGNOSIS — U07.1 COVID-19: ICD-10-CM

## 2022-07-21 DIAGNOSIS — J96.11 CHRONIC RESPIRATORY FAILURE WITH HYPOXIA: ICD-10-CM

## 2022-07-21 DIAGNOSIS — I10 ESSENTIAL (PRIMARY) HYPERTENSION: ICD-10-CM

## 2022-07-22 ENCOUNTER — APPOINTMENT (OUTPATIENT)
Dept: PULMONOLOGY | Facility: CLINIC | Age: 72
End: 2022-07-22

## 2022-11-18 NOTE — PROGRESS NOTE ADULT - SUBJECTIVE AND OBJECTIVE BOX
************************************************  Joshua Gaines MD (PGY-1)  Spectra: [phone]  ************************************************    SUBJECTIVE / OVERNIGHT EVENTS  Patient slept well overnight. No acute complaints this AM. Patient does not report fevers, chills, CP, worsening SOB, or n/v/d. Currently requires 4L NC.    MEDICATIONS    amLODIPine   Tablet   5 milliGRAM(s) Oral (12-10-21 @ 05:14)    dexAMETHasone     Tablet   6 milliGRAM(s) Oral (12-10-21 @ 05:14)    enoxaparin Injectable   40 milliGRAM(s) SubCutaneous (12-10-21 @ 05:15)   40 milliGRAM(s) SubCutaneous (12-09-21 @ 17:52)    melatonin   5 milliGRAM(s) Oral (12-09-21 @ 21:01)      VITALS /  EXAM    T(C): 36.2 (12-10-21 @ 05:20), Max: 36.8 (12-09-21 @ 20:34)  HR: 60 (12-10-21 @ 05:20) (60 - 71)  BP: 148/70 (12-10-21 @ 05:20) (100/59 - 148/70)  RR: 18 (12-10-21 @ 05:20) (18 - 18)  SpO2: 94% (12-10-21 @ 08:35) (91% - 97%)    GENERAL: NAD, well-developed  CHEST/LUNG: b/l rhonchi  HEART: Regular rate and rhythm; No murmurs, rubs, or gallops  ABDOMEN: Soft, Nontender, Nondistended; Bowel sounds present, no masses.  EXTREMITIES:  2+ Peripheral Pulses, No clubbing, cyanosis, or edema    I's & O's     12-09-21 @ 07:01  -  12-10-21 @ 07:00  --------------------------------------------------------  IN:  Total IN: 0 mL    OUT:    Voided (mL): 400 mL  Total OUT: 400 mL    Total NET: -400 mL    LABS             12.4   13.81 )-----------( 416      ( 12-10-21 @ 07:04 )             36.6     135  |  100  |  16  -------------------------<  93   12-10-21 @ 07:04  5.1  |  21  |  0.7    Ca      8.3     12-10-21 @ 07:04  Phos   4.1     12-10-21 @ 07:04  Mg     2.3     12-10-21 @ 07:04    TPro  5.7  /  Alb  3.4  /  TBili  0.7  /  DBili  x   /  AST  27  /  ALT  46  /  AlkPhos  69  /  GGT  x     12-10-21 @ 07:04    D-Dimer Assay, Quantitative: 186 ng/mL DDU (12-10-21 @ 07:04)  Ferritin, Serum: 691 ng/mL (12-08-21 @ 10:51)  D-Dimer Assay, Quantitative: 350 ng/mL DDU (12-08-21 @ 04:30)  C-Reactive Protein, Serum: 19 mg/L (12-08-21 @ 04:30)   VITAL SIGNS: I have reviewed nursing notes and confirm.  CONSTITUTIONAL: Well-developed; well-nourished  SKIN: skin exam is warm and dry, no acute rash.    HEAD: Normocephalic; atraumatic.  EYES: PERRL, EOM intact; conjunctiva and sclera clear.  ENT: No nasal discharge; airway clear.  NECK: Supple; non tender.  CARD: S1, S2 normal; no murmurs, gallops, or rubs. Regular rate and rhythm.   RESP: No wheezes, rales or rhonchi.  ABD: Normal bowel sounds; soft; non-distended; non-tender  EXT: Normal ROM.  No clubbing, cyanosis or edema.   LYMPH: No acute cervical adenopathy.  NEURO: Full range of motion of all extremities 5 out of 5 muscle strength of flexor extensor mechanism, extinction intact, sensation intact, finger-nose intact, negative RombergAlert, oriented, grossly unremarkable  PSYCH: Cooperative, appropriate.

## 2023-10-18 NOTE — ED ADULT NURSE NOTE - CAS DISCH TRANSFER METHOD
The patient has been examined and the H&P has been reviewed:    I concur with the findings and no changes have occurred since H&P was written.    Anesthesia and Procedure risks, benefits and alternative options discussed and understood by patient/family.          There are no hospital problems to display for this patient.    
Oriented - self; Oriented - place; Oriented - time
Private car

## 2024-03-18 ENCOUNTER — OUTPATIENT (OUTPATIENT)
Dept: OUTPATIENT SERVICES | Facility: HOSPITAL | Age: 74
LOS: 1 days | End: 2024-03-18
Payer: COMMERCIAL

## 2024-03-18 DIAGNOSIS — K02.9 DENTAL CARIES, UNSPECIFIED: ICD-10-CM

## 2024-03-18 PROCEDURE — D0140: CPT

## 2024-03-22 DIAGNOSIS — K02.9 DENTAL CARIES, UNSPECIFIED: ICD-10-CM

## 2024-05-10 ENCOUNTER — APPOINTMENT (OUTPATIENT)
Dept: INTERNAL MEDICINE | Facility: CLINIC | Age: 74
End: 2024-05-10

## 2024-05-10 ENCOUNTER — OUTPATIENT (OUTPATIENT)
Dept: OUTPATIENT SERVICES | Facility: HOSPITAL | Age: 74
LOS: 1 days | End: 2024-05-10
Payer: COMMERCIAL

## 2024-05-10 VITALS
HEIGHT: 64 IN | HEART RATE: 68 BPM | OXYGEN SATURATION: 96 % | WEIGHT: 201 LBS | TEMPERATURE: 98.2 F | BODY MASS INDEX: 34.31 KG/M2 | SYSTOLIC BLOOD PRESSURE: 188 MMHG | DIASTOLIC BLOOD PRESSURE: 98 MMHG

## 2024-05-10 DIAGNOSIS — Z00.00 ENCOUNTER FOR GENERAL ADULT MEDICAL EXAMINATION WITHOUT ABNORMAL FINDINGS: ICD-10-CM

## 2024-05-10 DIAGNOSIS — E55.9 VITAMIN D DEFICIENCY, UNSPECIFIED: ICD-10-CM

## 2024-05-10 PROCEDURE — 80061 LIPID PANEL: CPT

## 2024-05-10 PROCEDURE — 84153 ASSAY OF PSA TOTAL: CPT

## 2024-05-10 PROCEDURE — 84443 ASSAY THYROID STIM HORMONE: CPT

## 2024-05-10 PROCEDURE — 85027 COMPLETE CBC AUTOMATED: CPT

## 2024-05-10 PROCEDURE — 80053 COMPREHEN METABOLIC PANEL: CPT

## 2024-05-10 PROCEDURE — 99204 OFFICE O/P NEW MOD 45 MIN: CPT

## 2024-05-10 PROCEDURE — 83036 HEMOGLOBIN GLYCOSYLATED A1C: CPT

## 2024-05-10 PROCEDURE — 82306 VITAMIN D 25 HYDROXY: CPT

## 2024-05-10 PROCEDURE — 36415 COLL VENOUS BLD VENIPUNCTURE: CPT

## 2024-05-10 RX ORDER — HYDROCHLOROTHIAZIDE 12.5 MG/1
12.5 TABLET ORAL DAILY
Qty: 60 | Refills: 2 | Status: ACTIVE | COMMUNITY
Start: 2024-05-10 | End: 1900-01-01

## 2024-05-10 NOTE — REVIEW OF SYSTEMS
[Joint Pain] : joint pain [Muscle Pain] : muscle pain [Negative] : Heme/Lymph [FreeTextEntry9] : b/l thighs and knees

## 2024-05-10 NOTE — ASSESSMENT
[FreeTextEntry1] : 74 year old male patient with PMH of BPH, HTN and Vit D deficiency who presents to establish care.   # HTN - /98 today - Start HCTZ 12.5 mg once daily - Monitor BP closely - Educated about DASH diet and exercise  # Pre-diabetes - A1C 5.8% back in 2021 - obtain repeat A1C  # HLD - Triglyceride 308,  back in 2021 - obtain lipid panel - hold off on medications until lipid panel  # BPH - states that symptoms have resolved - obtain PSA - no need for medications at the moment  # Vit D deficiency - Vit D 22 back in 2021 - obtain vit D levels  # HCM - Obtain CBC, CMP, lipid panel, TSH, Vit D, PSA - RTC in 1 month

## 2024-05-10 NOTE — PHYSICAL EXAM
[Normal] : soft, non-tender, non-distended, no masses palpated, no HSM and normal bowel sounds [de-identified] : b/l LE edema

## 2024-05-10 NOTE — HISTORY OF PRESENT ILLNESS
[FreeTextEntry1] : establish care [de-identified] : 74 year old male patient with PMH of BPH, HTN and Vit D deficiency who presents to establish care. Still complains of HTN. can be above SBP 200s. Pain in legs and knees. pain affects his ability to climb stairs. Pain only occurs with activity. Denies all other complaints.   Does not currently take medications. No known allergies. Former smoker. quit more than 30 years ago.   Interpretation provided by daughter.

## 2024-05-13 LAB
25(OH)D3 SERPL-MCNC: 38 NG/ML
ALBUMIN SERPL ELPH-MCNC: 4.6 G/DL
ALP BLD-CCNC: 83 U/L
ALT SERPL-CCNC: 26 U/L
ANION GAP SERPL CALC-SCNC: 14 MMOL/L
AST SERPL-CCNC: 27 U/L
BASOPHILS # BLD AUTO: 0.05 K/UL
BASOPHILS NFR BLD AUTO: 0.8 %
BILIRUB SERPL-MCNC: 0.8 MG/DL
BUN SERPL-MCNC: 12 MG/DL
CALCIUM SERPL-MCNC: 9.2 MG/DL
CHLORIDE SERPL-SCNC: 103 MMOL/L
CHOLEST SERPL-MCNC: 257 MG/DL
CO2 SERPL-SCNC: 24 MMOL/L
CREAT SERPL-MCNC: 0.9 MG/DL
EGFR: 89 ML/MIN/1.73M2
EOSINOPHIL # BLD AUTO: 0.19 K/UL
EOSINOPHIL NFR BLD AUTO: 3 %
ESTIMATED AVERAGE GLUCOSE: 126 MG/DL
GLUCOSE SERPL-MCNC: 96 MG/DL
HBA1C MFR BLD HPLC: 6 %
HCT VFR BLD CALC: 44 %
HDLC SERPL-MCNC: 41 MG/DL
HGB BLD-MCNC: 14.3 G/DL
IMM GRANULOCYTES NFR BLD AUTO: 0.6 %
LDLC SERPL CALC-MCNC: 171 MG/DL
LYMPHOCYTES # BLD AUTO: 1.8 K/UL
LYMPHOCYTES NFR BLD AUTO: 28.1 %
MAN DIFF?: NORMAL
MCHC RBC-ENTMCNC: 30.8 PG
MCHC RBC-ENTMCNC: 32.5 G/DL
MCV RBC AUTO: 94.8 FL
MONOCYTES # BLD AUTO: 0.47 K/UL
MONOCYTES NFR BLD AUTO: 7.3 %
NEUTROPHILS # BLD AUTO: 3.86 K/UL
NEUTROPHILS NFR BLD AUTO: 60.2 %
NONHDLC SERPL-MCNC: 216 MG/DL
PLATELET # BLD AUTO: 200 K/UL
PMV BLD AUTO: 0 /100 WBCS
POTASSIUM SERPL-SCNC: 4.6 MMOL/L
PROT SERPL-MCNC: 7.1 G/DL
PSA SERPL-MCNC: 5.41 NG/ML
RBC # BLD: 4.64 M/UL
RBC # FLD: 14.6 %
SODIUM SERPL-SCNC: 141 MMOL/L
TRIGL SERPL-MCNC: 223 MG/DL
TSH SERPL-ACNC: 2.96 UIU/ML
WBC # FLD AUTO: 6.41 K/UL

## 2024-05-15 DIAGNOSIS — N40.0 BENIGN PROSTATIC HYPERPLASIA WITHOUT LOWER URINARY TRACT SYMPTOMS: ICD-10-CM

## 2024-05-15 DIAGNOSIS — E55.9 VITAMIN D DEFICIENCY, UNSPECIFIED: ICD-10-CM

## 2024-05-15 DIAGNOSIS — E78.5 HYPERLIPIDEMIA, UNSPECIFIED: ICD-10-CM

## 2024-05-15 DIAGNOSIS — I10 ESSENTIAL (PRIMARY) HYPERTENSION: ICD-10-CM

## 2024-05-15 DIAGNOSIS — Z00.00 ENCOUNTER FOR GENERAL ADULT MEDICAL EXAMINATION WITHOUT ABNORMAL FINDINGS: ICD-10-CM

## 2024-05-15 DIAGNOSIS — R73.03 PREDIABETES: ICD-10-CM

## 2024-05-17 RX ORDER — ATORVASTATIN CALCIUM 10 MG/1
10 TABLET, FILM COATED ORAL
Qty: 30 | Refills: 5 | Status: ACTIVE | COMMUNITY
Start: 2022-01-12 | End: 1900-01-01

## 2024-06-24 ENCOUNTER — OUTPATIENT (OUTPATIENT)
Dept: OUTPATIENT SERVICES | Facility: HOSPITAL | Age: 74
LOS: 1 days | End: 2024-06-24
Payer: COMMERCIAL

## 2024-06-24 ENCOUNTER — APPOINTMENT (OUTPATIENT)
Dept: INTERNAL MEDICINE | Facility: CLINIC | Age: 74
End: 2024-06-24

## 2024-06-24 VITALS
WEIGHT: 199.13 LBS | TEMPERATURE: 97.3 F | DIASTOLIC BLOOD PRESSURE: 91 MMHG | SYSTOLIC BLOOD PRESSURE: 190 MMHG | OXYGEN SATURATION: 97 % | HEART RATE: 67 BPM | HEIGHT: 64 IN | BODY MASS INDEX: 33.99 KG/M2

## 2024-06-24 DIAGNOSIS — Z12.11 ENCOUNTER FOR SCREENING FOR MALIGNANT NEOPLASM OF COLON: ICD-10-CM

## 2024-06-24 DIAGNOSIS — R73.03 PREDIABETES.: ICD-10-CM

## 2024-06-24 DIAGNOSIS — E78.5 HYPERLIPIDEMIA, UNSPECIFIED: ICD-10-CM

## 2024-06-24 DIAGNOSIS — N40.0 BENIGN PROSTATIC HYPERPLASIA WITHOUT LOWER URINARY TRACT SYMPMS: ICD-10-CM

## 2024-06-24 DIAGNOSIS — I10 ESSENTIAL (PRIMARY) HYPERTENSION: ICD-10-CM

## 2024-06-24 DIAGNOSIS — R06.02 SHORTNESS OF BREATH: ICD-10-CM

## 2024-06-24 DIAGNOSIS — E66.9 OBESITY, UNSPECIFIED: ICD-10-CM

## 2024-06-24 DIAGNOSIS — R60.9 EDEMA, UNSPECIFIED: ICD-10-CM

## 2024-06-24 DIAGNOSIS — Z01.20 ENCOUNTER FOR DENTAL EXAMINATION AND CLEANING WITHOUT ABNORMAL FINDINGS: ICD-10-CM

## 2024-06-24 DIAGNOSIS — Z00.00 ENCOUNTER FOR GENERAL ADULT MEDICAL EXAMINATION WITHOUT ABNORMAL FINDINGS: ICD-10-CM

## 2024-06-24 PROCEDURE — 99215 OFFICE O/P EST HI 40 MIN: CPT

## 2024-06-24 PROCEDURE — D0230: CPT

## 2024-06-24 PROCEDURE — D0150: CPT

## 2024-06-24 PROCEDURE — D0330: CPT

## 2024-06-24 PROCEDURE — D1110: CPT

## 2024-06-24 RX ORDER — ATORVASTATIN CALCIUM 20 MG/1
20 TABLET, FILM COATED ORAL
Qty: 30 | Refills: 3 | Status: ACTIVE | COMMUNITY
Start: 2024-06-24 | End: 1900-01-01

## 2024-06-24 RX ORDER — AMLODIPINE BESYLATE 5 MG/1
5 TABLET ORAL
Qty: 30 | Refills: 3 | Status: DISCONTINUED | COMMUNITY
Start: 2024-06-24 | End: 2024-06-24

## 2024-06-24 RX ORDER — TAMSULOSIN HYDROCHLORIDE 0.4 MG/1
0.4 CAPSULE ORAL DAILY
Qty: 30 | Refills: 3 | Status: ACTIVE | COMMUNITY
Start: 2024-06-24 | End: 1900-01-01

## 2024-06-25 DIAGNOSIS — R06.02 SHORTNESS OF BREATH: ICD-10-CM

## 2024-06-25 DIAGNOSIS — R60.9 EDEMA, UNSPECIFIED: ICD-10-CM

## 2024-06-25 DIAGNOSIS — R73.03 PREDIABETES: ICD-10-CM

## 2024-06-25 DIAGNOSIS — E78.5 HYPERLIPIDEMIA, UNSPECIFIED: ICD-10-CM

## 2024-06-25 DIAGNOSIS — E66.9 OBESITY, UNSPECIFIED: ICD-10-CM

## 2024-06-25 DIAGNOSIS — N40.0 BENIGN PROSTATIC HYPERPLASIA WITHOUT LOWER URINARY TRACT SYMPTOMS: ICD-10-CM

## 2024-06-25 DIAGNOSIS — I10 ESSENTIAL (PRIMARY) HYPERTENSION: ICD-10-CM

## 2024-06-25 DIAGNOSIS — Z12.11 ENCOUNTER FOR SCREENING FOR MALIGNANT NEOPLASM OF COLON: ICD-10-CM

## 2024-06-25 DIAGNOSIS — Z01.21 ENCOUNTER FOR DENTAL EXAMINATION AND CLEANING WITH ABNORMAL FINDINGS: ICD-10-CM

## 2024-06-27 NOTE — PATIENT PROFILE ADULT - DO YOU FEEL THREATENED BY OTHERS?
Physical Therapy    Visit Type: initial evaluation and treatment  SUBJECTIVE  Patient agreed to participate in therapy this date.  \"I think I am 95% back to normal.\"  Patient / Family Goal: maximize function and return home    Pain     At onset of session (out of 10): 0     OBJECTIVE     Cognitive Status   Level of Consciousness   - alert  Affect/Behavior    - calm and cooperative  Orientation    - Oriented to: person, place, time and situation  Following Direction   - follows all commands and directions consistently    Posture:  - Seated: good  - Standing: good      Range of Motion (ROM)   (degrees unless noted; active unless noted; norms in ( ); negative=lacking to 0, positive=beyond 0)  WFL: LLE, RLE    Strength  (out of 5 unless noted, standard test position unless noted)   WFL: LLE, RLE      Sitting Balance  (MARIEL = base of support)  Static      - Trial 1 details: independent  Dynamic      - Trial 1 details: independent    Standing Balance  (MARIEL = base of support)  Firm Surface: Double Leg      - Static, Eyes Open       - Trial 1 details: independent     - Dynamic, Eyes Open       - Trial 1 details: minimal assist      Coordination  LLE: intact   RLE: intact    Sensation/Dermatome Testing:    Intact: LLE light touch, RLE light touch    Bed Mobility  - Rolling left: independent  - Rolling right: independent  - Supine to sit: independent  - Sit to supine: independent  Transfers  Assistive devices: none  - Sit to stand: modified independent  - Stand to sit: modified independent    Ambulation / Gait  - Assistive device: none  - Distance (feet unless otherwise indicated): 25, 25  - Assist Level: supervision  Decreased dynamic standing balance     Interventions     Training provided: bed mobility training, gait training, transfer training, safety training, neuromuscular reeducation and positioning    Skilled input: Tactile instruction/cues and verbal instruction/cues  Verbal Consent: Writer verbally educated and  received verbal consent for hand placement, positioning of patient, and techniques to be performed today from patient for clothing adjustments for techniques as described above and how they are pertinent to the patient's plan of care.         Education:   - Present and ready to learn: patient  Education provided during session:  - Results of above outlined education: Demonstrates understanding    ASSESSMENT   Discharge needs based on today's assessment:   - Current level of function: slightly below baseline level of function   - Therapy needs at discharge: outpatient therapy 1-3 times per week   - Activities of daily living (ADLs) requiring support at discharge: ambulation and stairs   - Impairments that require further therapy intervention: balance    Patient tolerated physical therapy well. Patient is safe and independent in all mobilities. Patient has slight decreased dynamic standing balance.    AM-PAC  - Generalized Prior Level of Function: IND/MOD I ([facility] 22-24)       Key: MOD A=moderate assistance, IND/MOD I=independent/modified independent  - Generalized Current Level of Function     - Current Mobility Score: 22       AM-PAC Scoring Key= >21 Modified Independent; 20-21 Supervision; 18-19 Minimal assist; 13-17 Moderate assist; 9-12 Max assist; <9 Total assist        Predicted patient presentation: Low (stable) - Patient comorbidities and complexities, as defined above, will have little effect on progress for prescribed plan of care.    Pain at End of Session:   Pain: 0/10    PLAN (while hospitalized)  Suggestions for next session as indicated: Continue physical therapy for dynamic standing balance activities.  PT Frequency: 1-2 x per week      PT/OT Mobility Equipment for Discharge: none  Interventions: balance, neuromuscular re-education and gait training  Agreement to plan and goals: patient agrees with goals and treatment plan        GOALS  Long Term Goals: (to be met by time of discharge from  hospital)  Ambulation (even): Patient will ambulate on even surface for 150 feet with no device, independent.   Status: progressing/ongoing  Documented in the chart in the following areas: Prior Level of Function. Assessment/Plan.      Patient at End of Session:   Location: in bed  Safety measures: bed rails x2, call light within reach, equipment intact and lines intact  Handoff to: nurse      Therapy procedure time and total treatment time can be found documented on the Time Entry flowsheet   no

## 2024-07-10 ENCOUNTER — OUTPATIENT (OUTPATIENT)
Dept: OUTPATIENT SERVICES | Facility: HOSPITAL | Age: 74
LOS: 1 days | End: 2024-07-10
Payer: COMMERCIAL

## 2024-07-10 ENCOUNTER — RESULT REVIEW (OUTPATIENT)
Age: 74
End: 2024-07-10

## 2024-07-10 DIAGNOSIS — R06.02 SHORTNESS OF BREATH: ICD-10-CM

## 2024-07-10 DIAGNOSIS — R60.9 EDEMA, UNSPECIFIED: ICD-10-CM

## 2024-07-10 PROCEDURE — 71045 X-RAY EXAM CHEST 1 VIEW: CPT | Mod: 26

## 2024-07-10 PROCEDURE — 36415 COLL VENOUS BLD VENIPUNCTURE: CPT

## 2024-07-10 PROCEDURE — 83880 ASSAY OF NATRIURETIC PEPTIDE: CPT

## 2024-07-10 PROCEDURE — 71045 X-RAY EXAM CHEST 1 VIEW: CPT

## 2024-07-11 DIAGNOSIS — R60.9 EDEMA, UNSPECIFIED: ICD-10-CM

## 2024-07-11 DIAGNOSIS — R06.02 SHORTNESS OF BREATH: ICD-10-CM

## 2024-07-17 ENCOUNTER — OUTPATIENT (OUTPATIENT)
Dept: OUTPATIENT SERVICES | Facility: HOSPITAL | Age: 74
LOS: 1 days | End: 2024-07-17
Payer: COMMERCIAL

## 2024-07-17 ENCOUNTER — RESULT REVIEW (OUTPATIENT)
Age: 74
End: 2024-07-17

## 2024-07-17 ENCOUNTER — APPOINTMENT (OUTPATIENT)
Dept: CV DIAGNOSITCS | Facility: HOSPITAL | Age: 74
End: 2024-07-17
Payer: COMMERCIAL

## 2024-07-17 DIAGNOSIS — R60.9 EDEMA, UNSPECIFIED: ICD-10-CM

## 2024-07-17 PROCEDURE — 93306 TTE W/DOPPLER COMPLETE: CPT | Mod: 26

## 2024-07-17 PROCEDURE — 93306 TTE W/DOPPLER COMPLETE: CPT

## 2024-07-18 DIAGNOSIS — R60.9 EDEMA, UNSPECIFIED: ICD-10-CM

## 2024-07-26 ENCOUNTER — APPOINTMENT (OUTPATIENT)
Dept: INTERNAL MEDICINE | Facility: CLINIC | Age: 74
End: 2024-07-26

## 2024-07-26 VITALS — HEIGHT: 64 IN | BODY MASS INDEX: 33.12 KG/M2 | WEIGHT: 194 LBS

## 2024-07-26 VITALS
HEART RATE: 71 BPM | DIASTOLIC BLOOD PRESSURE: 85 MMHG | OXYGEN SATURATION: 98 % | SYSTOLIC BLOOD PRESSURE: 150 MMHG | TEMPERATURE: 98.2 F

## 2024-07-26 DIAGNOSIS — I10 ESSENTIAL (PRIMARY) HYPERTENSION: ICD-10-CM

## 2024-07-26 DIAGNOSIS — E66.9 OBESITY, UNSPECIFIED: ICD-10-CM

## 2024-07-26 DIAGNOSIS — R73.03 PREDIABETES.: ICD-10-CM

## 2024-07-26 DIAGNOSIS — N40.0 BENIGN PROSTATIC HYPERPLASIA WITHOUT LOWER URINARY TRACT SYMPMS: ICD-10-CM

## 2024-07-26 NOTE — ASSESSMENT
[FreeTextEntry1] : 74 year old male patient with PMH of BPH, HTN and Vit D deficiency who presents for a follow-up visit  # HTN - BP high today 161/89 today. Last visit was 190s systolic - inc HCTZ from 12.5 to 25mg - Monitor BP closely - Educated about DASH diet and exercise  # Pre-diabetes - A1C 6.0% (5/2024) - obtain repeat A1C in 3 months  # HLD - Triglyceride 223,  (5/2024) - ASCVD score > 7.5 - c/w Lipitor 20mg qd  # BPH - states that symptoms have resolved - PSA 5.41 - c/w tamsulosin - urology referral Has appt scheduled in Aug 2024  # Vit D deficiency - vit D 38 - resolved  #Piiting edema #Shortness of breath - admits having dyspnea on exertion but has resolved - TTE: LV Ejection Fraction by Blanc's Method with a biplane EF of 52 %. Spectral Doppler shows impaired relaxation pattern of left ventricular myocardial filling (Grade I diastolic dysfunction). - Chest X-ray 6/2024: Elevated right hemidiaphragm, unchanged. No acute infiltrates, pleural effusions or pneumothoraces. - BNP 46  #Blurred vision. - chronic complaint - ophthalmology referral  # HCM - Colonoscopy: DOES NOT REMEMBER HIS LAST COLONOSCOPY. Has appt scheduled in Dec 2024 - RTC in 3 month

## 2024-07-26 NOTE — HISTORY OF PRESENT ILLNESS
[FreeTextEntry1] : Follow up [de-identified] : 74-year-old male patient with PMH of BPH, HTN and Vit D deficiency who presents for a follow up visit. He has no complaints today.

## 2024-07-29 ENCOUNTER — OUTPATIENT (OUTPATIENT)
Dept: OUTPATIENT SERVICES | Facility: HOSPITAL | Age: 74
LOS: 1 days | End: 2024-07-29
Payer: COMMERCIAL

## 2024-07-29 DIAGNOSIS — K08.409 PARTIAL LOSS OF TEETH, UNSPECIFIED CAUSE, UNSPECIFIED CLASS: ICD-10-CM

## 2024-07-29 PROCEDURE — D2331: CPT

## 2024-07-29 PROCEDURE — T1013: CPT

## 2024-07-29 PROCEDURE — D9310: CPT

## 2024-07-30 DIAGNOSIS — K08.409 PARTIAL LOSS OF TEETH, UNSPECIFIED CAUSE, UNSPECIFIED CLASS: ICD-10-CM

## 2024-08-05 ENCOUNTER — APPOINTMENT (OUTPATIENT)
Age: 74
End: 2024-08-05

## 2024-11-04 ENCOUNTER — APPOINTMENT (OUTPATIENT)
Dept: INTERNAL MEDICINE | Facility: CLINIC | Age: 74
End: 2024-11-04

## 2024-11-04 ENCOUNTER — OUTPATIENT (OUTPATIENT)
Dept: OUTPATIENT SERVICES | Facility: HOSPITAL | Age: 74
LOS: 1 days | End: 2024-11-04

## 2024-11-04 VITALS
OXYGEN SATURATION: 97 % | HEART RATE: 66 BPM | DIASTOLIC BLOOD PRESSURE: 81 MMHG | TEMPERATURE: 97.5 F | WEIGHT: 193 LBS | HEIGHT: 64 IN | SYSTOLIC BLOOD PRESSURE: 158 MMHG | BODY MASS INDEX: 32.95 KG/M2

## 2024-11-04 DIAGNOSIS — N40.0 BENIGN PROSTATIC HYPERPLASIA WITHOUT LOWER URINARY TRACT SYMPMS: ICD-10-CM

## 2024-11-04 DIAGNOSIS — I10 ESSENTIAL (PRIMARY) HYPERTENSION: ICD-10-CM

## 2024-11-04 DIAGNOSIS — Z00.00 ENCOUNTER FOR GENERAL ADULT MEDICAL EXAMINATION WITHOUT ABNORMAL FINDINGS: ICD-10-CM

## 2024-11-04 DIAGNOSIS — E66.9 OBESITY, UNSPECIFIED: ICD-10-CM

## 2024-11-04 DIAGNOSIS — R73.03 PREDIABETES.: ICD-10-CM

## 2024-11-04 PROCEDURE — 99214 OFFICE O/P EST MOD 30 MIN: CPT

## 2024-11-04 RX ORDER — HYDROCHLOROTHIAZIDE 12.5 MG/1
12.5 TABLET ORAL DAILY
Qty: 30 | Refills: 2 | Status: ACTIVE | COMMUNITY
Start: 2024-11-04 | End: 1900-01-01

## 2024-11-12 DIAGNOSIS — R73.03 PREDIABETES: ICD-10-CM

## 2024-11-12 DIAGNOSIS — E66.9 OBESITY, UNSPECIFIED: ICD-10-CM

## 2024-11-12 DIAGNOSIS — N40.0 BENIGN PROSTATIC HYPERPLASIA WITHOUT LOWER URINARY TRACT SYMPTOMS: ICD-10-CM

## 2024-11-12 DIAGNOSIS — I10 ESSENTIAL (PRIMARY) HYPERTENSION: ICD-10-CM

## 2024-11-18 ENCOUNTER — TRANSCRIPTION ENCOUNTER (OUTPATIENT)
Age: 74
End: 2024-11-18

## 2024-12-04 ENCOUNTER — OUTPATIENT (OUTPATIENT)
Dept: OUTPATIENT SERVICES | Facility: HOSPITAL | Age: 74
LOS: 1 days | End: 2024-12-04

## 2024-12-04 DIAGNOSIS — I10 ESSENTIAL (PRIMARY) HYPERTENSION: ICD-10-CM

## 2024-12-05 DIAGNOSIS — I10 ESSENTIAL (PRIMARY) HYPERTENSION: ICD-10-CM

## 2024-12-06 ENCOUNTER — APPOINTMENT (OUTPATIENT)
Dept: GASTROENTEROLOGY | Facility: CLINIC | Age: 74
End: 2024-12-06

## 2024-12-09 ENCOUNTER — OUTPATIENT (OUTPATIENT)
Dept: OUTPATIENT SERVICES | Facility: HOSPITAL | Age: 74
LOS: 1 days | End: 2024-12-09
Payer: COMMERCIAL

## 2024-12-09 ENCOUNTER — APPOINTMENT (OUTPATIENT)
Dept: INTERNAL MEDICINE | Facility: CLINIC | Age: 74
End: 2024-12-09

## 2024-12-09 VITALS
HEIGHT: 64 IN | DIASTOLIC BLOOD PRESSURE: 76 MMHG | WEIGHT: 199 LBS | SYSTOLIC BLOOD PRESSURE: 134 MMHG | HEART RATE: 92 BPM | TEMPERATURE: 97.4 F | BODY MASS INDEX: 33.97 KG/M2 | OXYGEN SATURATION: 97 %

## 2024-12-09 DIAGNOSIS — E78.5 HYPERLIPIDEMIA, UNSPECIFIED: ICD-10-CM

## 2024-12-09 DIAGNOSIS — E66.9 OBESITY, UNSPECIFIED: ICD-10-CM

## 2024-12-09 DIAGNOSIS — N40.0 BENIGN PROSTATIC HYPERPLASIA WITHOUT LOWER URINARY TRACT SYMPMS: ICD-10-CM

## 2024-12-09 DIAGNOSIS — I10 ESSENTIAL (PRIMARY) HYPERTENSION: ICD-10-CM

## 2024-12-09 DIAGNOSIS — Z00.00 ENCOUNTER FOR GENERAL ADULT MEDICAL EXAMINATION WITHOUT ABNORMAL FINDINGS: ICD-10-CM

## 2024-12-09 DIAGNOSIS — R73.03 PREDIABETES.: ICD-10-CM

## 2024-12-09 PROCEDURE — 99214 OFFICE O/P EST MOD 30 MIN: CPT

## 2024-12-10 DIAGNOSIS — R73.03 PREDIABETES: ICD-10-CM

## 2024-12-10 DIAGNOSIS — N40.0 BENIGN PROSTATIC HYPERPLASIA WITHOUT LOWER URINARY TRACT SYMPTOMS: ICD-10-CM

## 2024-12-10 DIAGNOSIS — E66.9 OBESITY, UNSPECIFIED: ICD-10-CM

## 2024-12-10 DIAGNOSIS — E78.5 HYPERLIPIDEMIA, UNSPECIFIED: ICD-10-CM

## 2024-12-10 DIAGNOSIS — I10 ESSENTIAL (PRIMARY) HYPERTENSION: ICD-10-CM
